# Patient Record
Sex: MALE | Race: WHITE | NOT HISPANIC OR LATINO | Employment: OTHER | URBAN - NONMETROPOLITAN AREA
[De-identification: names, ages, dates, MRNs, and addresses within clinical notes are randomized per-mention and may not be internally consistent; named-entity substitution may affect disease eponyms.]

---

## 2017-05-01 ENCOUNTER — OFFICE VISIT (OUTPATIENT)
Dept: FAMILY MEDICINE CLINIC | Facility: CLINIC | Age: 57
End: 2017-05-01

## 2017-05-01 VITALS
DIASTOLIC BLOOD PRESSURE: 90 MMHG | WEIGHT: 140 LBS | OXYGEN SATURATION: 97 % | SYSTOLIC BLOOD PRESSURE: 164 MMHG | BODY MASS INDEX: 22.5 KG/M2 | HEIGHT: 66 IN | HEART RATE: 102 BPM

## 2017-05-01 DIAGNOSIS — G90.50 RSD (REFLEX SYMPATHETIC DYSTROPHY): ICD-10-CM

## 2017-05-01 DIAGNOSIS — Z87.19 HISTORY OF DIVERTICULITIS: ICD-10-CM

## 2017-05-01 DIAGNOSIS — K21.9 GASTROESOPHAGEAL REFLUX DISEASE WITHOUT ESOPHAGITIS: ICD-10-CM

## 2017-05-01 DIAGNOSIS — G40.109 AUTONOMIC EPILEPTIC SEIZURES (HCC): ICD-10-CM

## 2017-05-01 DIAGNOSIS — G81.90 HEMIPARESIS (HCC): ICD-10-CM

## 2017-05-01 DIAGNOSIS — E78.2 MIXED HYPERLIPIDEMIA: ICD-10-CM

## 2017-05-01 DIAGNOSIS — I10 ESSENTIAL HYPERTENSION: ICD-10-CM

## 2017-05-01 PROBLEM — R55 VASOVAGAL SYNCOPE: Status: ACTIVE | Noted: 2017-05-01

## 2017-05-01 PROCEDURE — 99203 OFFICE O/P NEW LOW 30 MIN: CPT | Performed by: NURSE PRACTITIONER

## 2017-05-01 RX ORDER — DIPHENOXYLATE HYDROCHLORIDE AND ATROPINE SULFATE 2.5; .025 MG/1; MG/1
1 TABLET ORAL 4 TIMES DAILY PRN
COMMUNITY
End: 2019-09-12 | Stop reason: SDUPTHER

## 2017-05-01 RX ORDER — ACETAMINOPHEN AND CODEINE PHOSPHATE 300; 30 MG/1; MG/1
1 TABLET ORAL EVERY 6 HOURS PRN
COMMUNITY

## 2017-05-01 RX ORDER — LISINOPRIL AND HYDROCHLOROTHIAZIDE 20; 12.5 MG/1; MG/1
1 TABLET ORAL DAILY
COMMUNITY
End: 2017-08-01 | Stop reason: CLARIF

## 2017-05-01 RX ORDER — TRAMADOL HYDROCHLORIDE 50 MG/1
TABLET ORAL
Refills: 0 | COMMUNITY
Start: 2017-03-29

## 2017-05-01 RX ORDER — LORAZEPAM 2 MG/1
TABLET ORAL
Refills: 0 | COMMUNITY
Start: 2017-04-26 | End: 2017-05-25 | Stop reason: SDUPTHER

## 2017-05-25 RX ORDER — LORAZEPAM 2 MG/1
2 TABLET ORAL 2 TIMES DAILY PRN
Qty: 60 TABLET | Refills: 0 | OUTPATIENT
Start: 2017-05-25 | End: 2017-06-22 | Stop reason: SDUPTHER

## 2017-06-22 ENCOUNTER — TELEPHONE (OUTPATIENT)
Dept: FAMILY MEDICINE CLINIC | Facility: CLINIC | Age: 57
End: 2017-06-22

## 2017-06-22 RX ORDER — LORAZEPAM 2 MG/1
2 TABLET ORAL 2 TIMES DAILY PRN
Qty: 60 TABLET | Refills: 0 | OUTPATIENT
Start: 2017-06-22 | End: 2017-07-21 | Stop reason: SDUPTHER

## 2017-06-22 NOTE — TELEPHONE ENCOUNTER
Pt called and made his [facility] wellness exam and also questioned his rx request for ativan. Informed pt that Gita approved and we would be in touch with his pharmacy.

## 2017-06-23 ENCOUNTER — TELEPHONE (OUTPATIENT)
Dept: FAMILY MEDICINE CLINIC | Facility: CLINIC | Age: 57
End: 2017-06-23

## 2017-06-23 NOTE — TELEPHONE ENCOUNTER
----- Message from Jonas Terrazas sent at 6/22/2017 10:39 PM EDT -----  Regarding: RE: Prescription Question  Contact: [phone]  Thank you!  ----- Message -----  From: THEE CAMPOS  Sent: 6/22/2017  7:10 PM EDT  To: Jonas Terrazas  Subject: RE: Prescription Question    This has been called in to pharmacy.      ----- Message -----     From: Jonas Terrazas     Sent: 6/21/2017  1:22 PM EDT       To: DAVID Wilcox  Subject: Prescription Question    Good afternoon,    I am writing to have my prescription of Lorazepam refilled. It was last filled on May 25th at [facility] here in [place]. I am due to run out Saturday June 24th.    Thank you, in advance,    Jonas Terrazas  [address] [address]    Email: [email or web address]  Mobile phone: [phone]

## 2017-07-20 ENCOUNTER — TELEPHONE (OUTPATIENT)
Dept: FAMILY MEDICINE CLINIC | Facility: CLINIC | Age: 57
End: 2017-07-20

## 2017-07-20 NOTE — TELEPHONE ENCOUNTER
----- Message from Jonas Terrazas sent at 7/20/2017 10:19 AM EDT -----  Regarding: RE: Prescription Question  Contact: [phone]  Thank you very much!  ----- Message -----  From: THEE CAMPOS  Sent: 7/20/2017 10:09 AM EDT  To: Jonas Terrazas  Subject: RE: Prescription Question    I will get this request in to jordan. Just check with the pharmacy later this afternoon if you dont hear from me. I will call you if there is an issue with refilling it   thanks RAYMON Farnsworth      ----- Message -----     From: Jonas Terrazas     Sent: 7/20/2017  9:37 AM EDT       To: DAVID Wilcox  Subject: Prescription Question    Good morning,    I am writing to have my prescription of Lorazepam refilled. It was last filled on June 23rd at [facility] here in [place]. I am due to run out this Sunday July 23rd.    I am looking forward to my next follow up appointment on August 1st to discuss my blood pressure medication with the water pill mixed in with it.     Thank you, in advance,    Jonas Terrazas  [address] [id number]    Email: [email or web address]  Mobile phone: [phone]

## 2017-07-21 RX ORDER — LORAZEPAM 2 MG/1
2 TABLET ORAL 2 TIMES DAILY PRN
Qty: 60 TABLET | Refills: 0 | OUTPATIENT
Start: 2017-07-21 | End: 2017-08-21 | Stop reason: SDUPTHER

## 2017-07-21 NOTE — TELEPHONE ENCOUNTER
----- Message from Daja Carrillo MA sent at 7/21/2017 10:29 AM EDT -----  Called in refill to pharmacy    ----- Message -----     From: DAVID Wilcox     Sent: 7/20/2017   1:41 PM       To: Daja Carrillo MA    Ok to refill  ----- Message -----     From: Daja Carrillo MA     Sent: 7/20/2017  10:11 AM       To: DAVID Wilcox    Pt sent message requesting refill on Lorazepam. Updated Marco A in your tray for review.

## 2017-08-01 ENCOUNTER — OFFICE VISIT (OUTPATIENT)
Dept: FAMILY MEDICINE CLINIC | Facility: CLINIC | Age: 57
End: 2017-08-01

## 2017-08-01 VITALS
DIASTOLIC BLOOD PRESSURE: 84 MMHG | HEIGHT: 66 IN | WEIGHT: 142 LBS | OXYGEN SATURATION: 98 % | HEART RATE: 100 BPM | SYSTOLIC BLOOD PRESSURE: 164 MMHG | BODY MASS INDEX: 22.82 KG/M2

## 2017-08-01 DIAGNOSIS — K21.9 GASTROESOPHAGEAL REFLUX DISEASE WITHOUT ESOPHAGITIS: ICD-10-CM

## 2017-08-01 DIAGNOSIS — G81.90 HEMIPARESIS (HCC): ICD-10-CM

## 2017-08-01 DIAGNOSIS — E78.2 MIXED HYPERLIPIDEMIA: ICD-10-CM

## 2017-08-01 DIAGNOSIS — G89.21 CHRONIC PAIN DUE TO INJURY: ICD-10-CM

## 2017-08-01 DIAGNOSIS — G90.50 RSD (REFLEX SYMPATHETIC DYSTROPHY): ICD-10-CM

## 2017-08-01 DIAGNOSIS — M54.6 ACUTE MIDLINE THORACIC BACK PAIN: ICD-10-CM

## 2017-08-01 DIAGNOSIS — I10 ESSENTIAL HYPERTENSION: ICD-10-CM

## 2017-08-01 DIAGNOSIS — G40.109 AUTONOMIC EPILEPTIC SEIZURES (HCC): ICD-10-CM

## 2017-08-01 LAB
BILIRUB BLD-MCNC: NEGATIVE MG/DL
CLARITY, POC: ABNORMAL
COLOR UR: YELLOW
GLUCOSE UR STRIP-MCNC: NEGATIVE MG/DL
KETONES UR QL: NEGATIVE
LEUKOCYTE EST, POC: NEGATIVE
NITRITE UR-MCNC: NEGATIVE MG/ML
PH UR: 6 [PH] (ref 5–8)
PROT UR STRIP-MCNC: NEGATIVE MG/DL
RBC # UR STRIP: ABNORMAL /UL
SP GR UR: 1.01 (ref 1–1.03)
UROBILINOGEN UR QL: NORMAL

## 2017-08-01 PROCEDURE — 81003 URINALYSIS AUTO W/O SCOPE: CPT | Performed by: NURSE PRACTITIONER

## 2017-08-01 PROCEDURE — 36415 COLL VENOUS BLD VENIPUNCTURE: CPT | Performed by: NURSE PRACTITIONER

## 2017-08-01 PROCEDURE — 99214 OFFICE O/P EST MOD 30 MIN: CPT | Performed by: NURSE PRACTITIONER

## 2017-08-01 RX ORDER — OMEPRAZOLE 20 MG/1
20 CAPSULE, DELAYED RELEASE ORAL DAILY
COMMUNITY
End: 2022-08-18

## 2017-08-01 RX ORDER — LISINOPRIL 20 MG/1
20 TABLET ORAL DAILY
Qty: 30 TABLET | Refills: 1 | Status: SHIPPED | OUTPATIENT
Start: 2017-08-01 | End: 2017-09-26 | Stop reason: SDUPTHER

## 2017-08-01 RX ORDER — FAMOTIDINE 10 MG
10 TABLET ORAL DAILY PRN
COMMUNITY
End: 2022-08-18
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
## 2017-08-02 ENCOUNTER — HOSPITAL ENCOUNTER (OUTPATIENT)
Dept: GENERAL RADIOLOGY | Facility: HOSPITAL | Age: 57
Discharge: HOME | End: 2017-08-02
Admitting: NURSE PRACTITIONER

## 2017-08-02 ENCOUNTER — TELEPHONE (OUTPATIENT)
Dept: FAMILY MEDICINE CLINIC | Facility: CLINIC | Age: 57
End: 2017-08-02

## 2017-08-02 DIAGNOSIS — M54.6 ACUTE MIDLINE THORACIC BACK PAIN: ICD-10-CM

## 2017-08-02 LAB
ALBUMIN SERPL-MCNC: 5 G/DL (ref 3.5–5.5)
ALBUMIN/GLOB SERPL: 1.7 {RATIO} (ref 1.2–2.2)
ALP SERPL-CCNC: 87 IU/L (ref 39–117)
ALT SERPL-CCNC: 14 IU/L (ref 0–44)
AST SERPL-CCNC: 18 IU/L (ref 0–40)
BILIRUB SERPL-MCNC: 0.5 MG/DL (ref 0–1.2)
BUN SERPL-MCNC: 10 MG/DL (ref 6–24)
BUN/CREAT SERPL: 11 (ref 9–20)
CALCIUM SERPL-MCNC: 9.6 MG/DL (ref 8.7–10.2)
CHLORIDE SERPL-SCNC: 99 MMOL/L (ref 96–106)
CO2 SERPL-SCNC: 24 MMOL/L (ref 18–29)
CREAT SERPL-MCNC: 0.93 MG/DL (ref 0.76–1.27)
GLOBULIN SER CALC-MCNC: 3 G/DL (ref 1.5–4.5)
GLUCOSE SERPL-MCNC: 90 MG/DL (ref 65–99)
POTASSIUM SERPL-SCNC: 4.2 MMOL/L (ref 3.5–5.2)
PROT SERPL-MCNC: 8 G/DL (ref 6–8.5)
SODIUM SERPL-SCNC: 141 MMOL/L (ref 134–144)

## 2017-08-02 PROCEDURE — 72072 X-RAY EXAM THORAC SPINE 3VWS: CPT

## 2017-08-03 ENCOUNTER — TELEPHONE (OUTPATIENT)
Dept: FAMILY MEDICINE CLINIC | Facility: CLINIC | Age: 57
End: 2017-08-03

## 2017-08-03 DIAGNOSIS — R93.89 ABNORMAL X-RAY: ICD-10-CM

## 2017-08-03 DIAGNOSIS — M54.6 ACUTE MIDLINE THORACIC BACK PAIN: Primary | ICD-10-CM

## 2017-08-03 DIAGNOSIS — R31.9 HEMATURIA: Primary | ICD-10-CM
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
## 2017-08-03 NOTE — TELEPHONE ENCOUNTER
I spoke with pt and notified and he said that he wants to hold off on urology referral for right now. I notified yuan. He said that he would like to have another urine check in about a week and if it is still bad then go to urology. I told him that I would let you know but that should be fine.

## 2017-08-03 NOTE — TELEPHONE ENCOUNTER
----- Message from Daja Carrillo MA sent at 8/3/2017  2:14 PM EDT -----  Pt notified of results     ----- Message -----     From: DAVID Wilcox     Sent: 8/3/2017   1:09 PM       To: Daja Carrillo MA    His labs are  normal.

## 2017-08-10 ENCOUNTER — HOSPITAL ENCOUNTER (OUTPATIENT)
Dept: MRI IMAGING | Facility: HOSPITAL | Age: 57
Discharge: HOME | End: 2017-08-10
Admitting: NURSE PRACTITIONER

## 2017-08-10 DIAGNOSIS — R93.89 ABNORMAL X-RAY: ICD-10-CM

## 2017-08-10 DIAGNOSIS — M54.6 ACUTE MIDLINE THORACIC BACK PAIN: ICD-10-CM

## 2017-08-10 PROCEDURE — 72146 MRI CHEST SPINE W/O DYE: CPT

## 2017-08-13 DIAGNOSIS — M43.02 SPONDYLOLYSIS OF CERVICAL REGION: ICD-10-CM

## 2017-08-13 DIAGNOSIS — M50.90 CERVICAL NECK PAIN WITH EVIDENCE OF DISC DISEASE: Primary | ICD-10-CM

## 2017-08-14 ENCOUNTER — TELEPHONE (OUTPATIENT)
Dept: FAMILY MEDICINE CLINIC | Facility: CLINIC | Age: 57
End: 2017-08-14

## 2017-08-14 DIAGNOSIS — M54.6 THORACIC SPINE PAIN: Primary | ICD-10-CM

## 2017-08-14 DIAGNOSIS — M43.04 SPONDYLOLYSIS OF THORACIC REGION: ICD-10-CM

## 2017-08-14 NOTE — TELEPHONE ENCOUNTER
----- Message from Daja Carrillo MA sent at 8/14/2017  7:25 PM EDT -----  Pt notified of results and notified that we would contact him with apt of scan when scheduled    ----- Message -----     From: DAVID Wilcox     Sent: 8/13/2017   9:15 PM       To: Daja Carrillo MA    His MRI shows some spondylosis and spurring in his neck, so I am going to refer him to Neurosurgery and see if they want to do anything, since he is having pain.

## 2017-08-17 ENCOUNTER — TELEPHONE (OUTPATIENT)
Dept: FAMILY MEDICINE CLINIC | Facility: CLINIC | Age: 57
End: 2017-08-17

## 2017-08-17 NOTE — TELEPHONE ENCOUNTER
----- Message from Jonas Terrazas sent at 8/17/2017 12:59 PM EDT -----  Regarding: RE: Prescription Question  Contact: [phone]  Thank you Daja!  ----- Message -----  From: THEE CAMPOS  Sent: 8/17/2017 12:53 PM EDT  To: Jonas Terrazas  Subject: RE: Prescription Question    I have sent in the request to jordan. Should not be a problem. You can call the pharmacy later this after noon and check if refill had been sent in.  Thank You  RAYMON Farnsworth          ----- Message -----     From: Jonas Terrazas     Sent: 8/17/2017 12:39 PM EDT       To: DAVID Wilcox  Subject: Prescription Question    Good afternoon,    I am writing to have my prescription of Lorazepam refilled. It was last filled on July 21st at [facility] here in [place]. I am due to run out this Sunday, August 20th.    I will see you at my next follow up appointment on September 1st.    Thank you, in advance,    Jonas Terrazas  [address] [address]    Email: [email or web address]  Mobile phone: [phone]

## 2017-08-21 ENCOUNTER — TELEPHONE (OUTPATIENT)
Dept: FAMILY MEDICINE CLINIC | Facility: CLINIC | Age: 57
End: 2017-08-21

## 2017-08-21 RX ORDER — LORAZEPAM 2 MG/1
2 TABLET ORAL 2 TIMES DAILY PRN
Qty: 4 TABLET | Refills: 0 | OUTPATIENT
Start: 2017-08-21 | End: 2017-08-23 | Stop reason: SDUPTHER

## 2017-08-21 NOTE — TELEPHONE ENCOUNTER
Patient sent a message through my chart and he said jhon told him his refill for his ativan would be called in and ready to  on the 21st, but [facility] didn't have any record of it being called in.  Marco A in your basket.

## 2017-08-21 NOTE — TELEPHONE ENCOUNTER
Please call in refilled in med list. (#4 no refills); also Crystal will need a reminder for full refill tomorrow.

## 2017-08-21 NOTE — TELEPHONE ENCOUNTER
----- Message from Krissy Tellez MA sent at 8/21/2017 12:36 PM EDT -----  Regarding: FW: Prescription Question  Contact: [phone]      ----- Message -----     From: Jonas Terrazas     Sent: 8/21/2017  12:17 PM       To: [facility]  Subject: RE: Prescription Question                        Judi Rey,    Yes, to be on the safe side please call it in. Then I will be all set until tomorrow :-).     Thank you, very much,    Jonas  ----- Message -----  From: THEE CAMPOS  Sent: 8/21/2017 11:53 AM EDT  To: Jonas Terrazas  Subject: RE: Prescription Question    Moonshital, I'm Krissy the [facility] for Dr. Gaines, Gita and Daja are out of the office today. Gita will be back tomorrow.  I did look in you chart and seen the message to have your lorazepam refilled but I didn't see a response from Gita for a approval to call it in.  I checked with Dr. Gaines to see if she could refill it for you.  Dr Gaines said she could give script for one pill for today if you out, or you can wait until tomorrow for Gita to OK the whole months supply. Let me know what you would like us to do.    Thank you   Krissy Tellez [facility]    ----- Message -----     From: Jonas Terrazas     Sent: 8/19/2017 12:03 PM EDT       To: DAVID Wilcox  Subject: RE: Prescription Question    Judi Farnsworth,    I called [facility] pharmacy today (Saturday) to check if the refill for Lorazepam was called in for pickup tomorrow. They couldn't find anything yet. Could you check on the status, thank you :-)    Jonas  ----- Message -----  From: THEE CAMPOS  Sent: 8/17/2017 12:53 PM EDT  To: Jonas Terrazas  Subject: RE: Prescription Question    I have sent in the request to gita. Should not be a problem. You can call the pharmacy later this after noon and check if refill had been sent in.  Thank You  RAYMON Farnsworth          ----- Message -----     From: Jonas Terrazas     Sent: 8/17/2017 12:39 PM EDT       To: Gita Quesada,  DAVID  Subject: Prescription Question    Good afternoon,    I am writing to have my prescription of Lorazepam refilled. It was last filled on July 21st at [facility] here in [place]. I am due to run out this Sunday, August 20th.    I will see you at my next follow up appointment on September 1st.    Thank you, in advance,    Jonas Terrazas  [address] [place], [place] [id number]    Email: [email or web address]  Mobile phone: [phone]

## 2017-08-23 RX ORDER — LORAZEPAM 2 MG/1
2 TABLET ORAL 2 TIMES DAILY PRN
Qty: 60 TABLET | Refills: 0 | OUTPATIENT
Start: 2017-08-23 | End: 2017-09-22 | Stop reason: SDUPTHER

## 2017-08-23 NOTE — TELEPHONE ENCOUNTER
Patient was requesting a refill on his ativan. Dr. Gaines gave him in 4 pills until you returned to work, he called today to check the status on it.

## 2017-09-22 RX ORDER — LORAZEPAM 2 MG/1
2 TABLET ORAL 2 TIMES DAILY PRN
Qty: 60 TABLET | Refills: 0 | OUTPATIENT
Start: 2017-09-22 | End: 2017-10-20 | Stop reason: SDUPTHER

## 2017-09-26 ENCOUNTER — OFFICE VISIT (OUTPATIENT)
Dept: FAMILY MEDICINE CLINIC | Facility: CLINIC | Age: 57
End: 2017-09-26

## 2017-09-26 VITALS
HEART RATE: 100 BPM | DIASTOLIC BLOOD PRESSURE: 78 MMHG | HEIGHT: 66 IN | SYSTOLIC BLOOD PRESSURE: 120 MMHG | OXYGEN SATURATION: 99 % | BODY MASS INDEX: 23.3 KG/M2 | WEIGHT: 145 LBS

## 2017-09-26 DIAGNOSIS — I10 ESSENTIAL HYPERTENSION: ICD-10-CM

## 2017-09-26 PROCEDURE — 99214 OFFICE O/P EST MOD 30 MIN: CPT | Performed by: NURSE PRACTITIONER

## 2017-09-26 RX ORDER — LISINOPRIL 20 MG/1
20 TABLET ORAL DAILY
Qty: 30 TABLET | Refills: 2 | Status: SHIPPED | OUTPATIENT
Start: 2017-09-26 | End: 2017-12-28 | Stop reason: SDUPTHER

## 2017-09-26 NOTE — PROGRESS NOTES
Subjective   Jonas Terrazas is a 56 y.o. male.     Hypertension   This is a recurrent problem. The current episode started more than 1 year ago. The problem has been rapidly improving since onset. The problem is controlled. Pertinent negatives include no anxiety, blurred vision, chest pain, headaches, malaise/fatigue, neck pain, orthopnea, palpitations, peripheral edema, PND, shortness of breath or sweats. Agents associated with hypertension include NSAIDs. There are no known risk factors for coronary artery disease. There are no compliance problems.         The following portions of the patient's history were reviewed and updated as appropriate: allergies, current medications, past family history, past medical history, past social history, past surgical history and problem list.    Review of Systems   Constitutional: Negative.  Negative for malaise/fatigue.   HENT: Negative.    Eyes: Negative.  Negative for blurred vision.   Respiratory: Negative.  Negative for shortness of breath.    Cardiovascular: Negative.  Negative for chest pain, palpitations, orthopnea and PND.   Gastrointestinal: Negative.    Genitourinary: Negative.    Musculoskeletal: Negative.  Negative for neck pain.   Skin: Negative.    Neurological: Negative for dizziness, syncope, weakness, numbness and headaches.   Hematological: Negative for adenopathy.   Psychiatric/Behavioral: Negative for confusion and suicidal ideas. The patient is not nervous/anxious.        Objective   Physical Exam   Constitutional: He is oriented to person, place, and time. He appears well-developed and well-nourished. No distress.   HENT:   Head: Normocephalic.   Right Ear: External ear normal.   Left Ear: External ear normal.   Nose: Nose normal.   Mouth/Throat: Oropharynx is clear and moist. No oropharyngeal exudate.   Eyes: Conjunctivae are normal.   Neck: Normal range of motion. Neck supple.   Cardiovascular: Normal rate, regular rhythm, normal heart sounds and intact  distal pulses.    No murmur heard.  Pulmonary/Chest: Effort normal and breath sounds normal. No respiratory distress. He has no wheezes. He has no rales. He exhibits no tenderness.   Abdominal: Soft. Bowel sounds are normal. He exhibits no distension and no mass. There is no hepatosplenomegaly or splenomegaly. There is no tenderness. There is no rebound and no guarding. No hernia.   Musculoskeletal: Normal range of motion. He exhibits no edema or tenderness.   NROM in all major joints   Neurological: He is alert and oriented to person, place, and time. Coordination and gait normal.   Skin: Skin is warm and dry. No rash noted.   Psychiatric: He has a normal mood and affect. His behavior is normal. Judgment and thought content normal.       Assessment/Plan   Jonas was seen today for follow-up.    Diagnoses and all orders for this visit:    Essential hypertension  -     lisinopril (PRINIVIL,ZESTRIL) 20 MG tablet; Take 1 tablet by mouth Daily.    Lisinopril continued today, since it is working well for him.    Patient has no concerns at this time.    Patient to RTC in 3 months for follow up.

## 2017-10-20 RX ORDER — LORAZEPAM 2 MG/1
2 TABLET ORAL 2 TIMES DAILY PRN
Qty: 60 TABLET | Refills: 0 | OUTPATIENT
Start: 2017-10-20 | End: 2017-11-17 | Stop reason: SDUPTHER

## 2017-10-20 NOTE — TELEPHONE ENCOUNTER
Called in to pharmacy. Pt notified      --- Message from DAVID Wilcox sent at 10/17/2017 12:22 PM EDT -----  Regarding: FW: Prescription Question  Contact: [phone]   Ok to fill Friday  ----- Message -----     From: Krissy Tellez MA     Sent: 10/17/2017  10:34 AM       To: DAVID Wilcox  Subject: FW: Prescription Question                            ----- Message -----     From: Jonas Terrazas     Sent: 10/17/2017  10:21 AM       To: [facility]  Subject: Prescription Question                            Greetings, on the this chilly fall morning.  My wife and I are having to adjust to the fall weather.  In [place] it was regularly in the mid to higher 80’s at this time of year.  We like this weather better :-).    I think Gita is off on Wednesdays. I am writing to have my prescription of Lorazepam scheduled for refill. It was last filled on September 22nd at [facility] here in [place].  I am due to run out this Sunday.     Thank you, in advance,    Jonas Terrazas  [address] [place] [address]    Email: [email or web address]  Mobile phone: [phone]

## 2017-11-16 ENCOUNTER — TELEPHONE (OUTPATIENT)
Dept: FAMILY MEDICINE CLINIC | Facility: CLINIC | Age: 57
End: 2017-11-16

## 2017-11-16 NOTE — TELEPHONE ENCOUNTER
----- Message from Jonas Terrazas sent at 11/15/2017 10:41 AM EST -----  Regarding: Prescription Question  Contact: [phone]  Hello,&#8232;&#8232;    I am writing to have my prescription of Lorazepam scheduled for refill. It was last filled on October 20th at [facility] here in [place]. I am due to run out this Sunday. &#8232;&#8232;    PS - Getting ready for Thanksgiving, and I can’t believe it is 39 days until Idania!  &#8232;  Thank you, in advance,&#8232;&#8232;    Jonas Terrazas&#8232;  [address]  &#8232;[place] [address]&#82[address];&#8232;    Email: [email or web address]&#[email or web address];  Mobile phone: [phone]

## 2017-11-17 RX ORDER — LORAZEPAM 2 MG/1
2 TABLET ORAL 2 TIMES DAILY PRN
Qty: 60 TABLET | Refills: 0 | OUTPATIENT
Start: 2017-11-17 | End: 2017-12-15 | Stop reason: SDUPTHER

## 2017-12-13 ENCOUNTER — TELEPHONE (OUTPATIENT)
Dept: FAMILY MEDICINE CLINIC | Facility: CLINIC | Age: 57
End: 2017-12-13

## 2017-12-13 NOTE — TELEPHONE ENCOUNTER
----- Message from Jonas Terrazas sent at 12/13/2017  5:15 PM EST -----  Regarding: Prescription Question  Contact: [phone]  Judi Pelayo and Daja,     Well, my wife and I have experienced our first [place] snow fall... It was nice to see and I hope we get some snow for Idania.    I am writing to have my prescription of Lorazepam scheduled for refill. It was last filled on November 17th at [facility] here in [place]. I am due to run out this Sunday.     I will see you at my follow up appointment on December 28th. I hope you and your family have a Merry Idania!     Thank you, in advance,    Jonas Terrazas  [facility] [place] [address]    Email: [email or web address]  Mobile phone: [phone]

## 2017-12-15 RX ORDER — LORAZEPAM 2 MG/1
2 TABLET ORAL 2 TIMES DAILY PRN
Qty: 60 TABLET | Refills: 0 | OUTPATIENT
Start: 2017-12-15 | End: 2018-01-10 | Stop reason: SDUPTHER

## 2017-12-28 ENCOUNTER — OFFICE VISIT (OUTPATIENT)
Dept: FAMILY MEDICINE CLINIC | Facility: CLINIC | Age: 57
End: 2017-12-28

## 2017-12-28 VITALS
DIASTOLIC BLOOD PRESSURE: 90 MMHG | BODY MASS INDEX: 23.24 KG/M2 | OXYGEN SATURATION: 96 % | HEART RATE: 94 BPM | SYSTOLIC BLOOD PRESSURE: 128 MMHG | WEIGHT: 144 LBS

## 2017-12-28 DIAGNOSIS — E78.2 MIXED HYPERLIPIDEMIA: ICD-10-CM

## 2017-12-28 DIAGNOSIS — G40.109 AUTONOMIC EPILEPTIC SEIZURES (HCC): ICD-10-CM

## 2017-12-28 DIAGNOSIS — I10 ESSENTIAL HYPERTENSION: ICD-10-CM

## 2017-12-28 DIAGNOSIS — K21.9 GASTROESOPHAGEAL REFLUX DISEASE WITHOUT ESOPHAGITIS: ICD-10-CM

## 2017-12-28 PROCEDURE — 99214 OFFICE O/P EST MOD 30 MIN: CPT | Performed by: NURSE PRACTITIONER

## 2017-12-28 RX ORDER — LISINOPRIL 20 MG/1
20 TABLET ORAL DAILY
Qty: 30 TABLET | Refills: 2 | Status: SHIPPED | OUTPATIENT
Start: 2017-12-28 | End: 2018-02-21 | Stop reason: SDUPTHER

## 2017-12-28 RX ORDER — ASPIRIN 81 MG/1
81 TABLET ORAL DAILY
COMMUNITY
End: 2022-03-31

## 2017-12-28 RX ORDER — LISINOPRIL 20 MG/1
20 TABLET ORAL DAILY
Qty: 30 TABLET | Refills: 2 | Status: SHIPPED | OUTPATIENT
Start: 2017-12-28 | End: 2017-12-28 | Stop reason: SDUPTHER

## 2017-12-28 RX ORDER — MORPHINE SULFATE 1 MG/ML
INJECTION, SOLUTION INTRAVENOUS CONTINUOUS
COMMUNITY

## 2017-12-28 NOTE — PROGRESS NOTES
Subjective   Jonas Terrazas is a 57 y.o. male.     Hypertension   This is a recurrent problem. The current episode started more than 1 year ago. The problem has been rapidly improving since onset. The problem is controlled. Pertinent negatives include no anxiety, blurred vision, chest pain, headaches, malaise/fatigue, neck pain, orthopnea, palpitations, peripheral edema, PND, shortness of breath or sweats. Agents associated with hypertension include decongestants and NSAIDs. Risk factors for coronary artery disease include family history. There are no compliance problems.    Hyperlipidemia   Pertinent negatives include no chest pain or shortness of breath.      His GERD is well controlled with the Pepcid and Prilosec.    His seizure disorder is well controlled, with no recent activity, with Ativan.    He is tolerating all of his medications well and taking them all as directed.     He is still seeing Pain Management for management of his pain and pain pump.    The following portions of the patient's history were reviewed and updated as appropriate: allergies, current medications, past family history, past medical history, past social history, past surgical history and problem list.    Review of Systems   Constitutional: Negative.  Negative for malaise/fatigue.   HENT: Positive for hearing loss (Having hearing tested next week in [place]). Negative for congestion, dental problem, ear discharge, ear pain, nosebleeds, postnasal drip, rhinorrhea, sinus pain, sinus pressure, sore throat, trouble swallowing and voice change.    Eyes: Negative.  Negative for blurred vision.   Respiratory: Negative for apnea, cough, chest tightness, shortness of breath and wheezing.    Cardiovascular: Negative for chest pain, palpitations, orthopnea, leg swelling and PND.   Gastrointestinal: Negative.    Endocrine: Negative.    Genitourinary: Negative.    Musculoskeletal: Negative for neck pain.   Allergic/Immunologic: Negative.     Neurological: Negative for dizziness, weakness, numbness and headaches.   Hematological: Negative.    Psychiatric/Behavioral: Negative.        Objective   Physical Exam   Constitutional: He is oriented to person, place, and time. He appears well-developed and well-nourished. No distress.   HENT:   Head: Normocephalic.   Right Ear: External ear normal.   Left Ear: External ear normal.   Nose: Nose normal.   Mouth/Throat: Oropharynx is clear and moist. No oropharyngeal exudate.   Eyes: Conjunctivae are normal.   Neck: Normal range of motion. Neck supple. No thyromegaly present.   Cardiovascular: Normal rate, regular rhythm, normal heart sounds and intact distal pulses.    No murmur heard.  Pulmonary/Chest: Effort normal and breath sounds normal. No respiratory distress. He has no wheezes. He has no rales. He exhibits no tenderness.   Abdominal: Soft. Bowel sounds are normal. He exhibits no distension and no mass. There is no hepatosplenomegaly or splenomegaly. There is no tenderness. No hernia.   Musculoskeletal: Normal range of motion. He exhibits no edema or tenderness.   Decreased ROM r/t hemiparesis and chronic pelvic and back pain   Neurological: He is alert and oriented to person, place, and time. Coordination and gait normal.   Skin: Skin is warm and dry. No rash noted.   Psychiatric: He has a normal mood and affect. His behavior is normal. Judgment and thought content normal.   Nursing note and vitals reviewed.      Assessment/Plan   Jonas was seen today for hypertension and hyperlipidemia.    Diagnoses and all orders for this visit:    Essential hypertension  -     Discontinue: lisinopril (PRINIVIL,ZESTRIL) 20 MG tablet; Take 1 tablet by mouth Daily.  -     lisinopril (PRINIVIL,ZESTRIL) 20 MG tablet; Take 1 tablet by mouth Daily.  -     Comprehensive Metabolic Panel; Future    Mixed hyperlipidemia  -     Comprehensive Metabolic Panel; Future  -     Lipid Panel; Future    Gastroesophageal reflux disease  without esophagitis  -     Comprehensive Metabolic Panel; Future    Autonomic epileptic seizures  -     Comprehensive Metabolic Panel; Future      Patient to continue all medications as directed.    Screening labs ordered and patient to RTC fasting. I will contact patient regarding test results and provide instructions regarding any necessary changes in plan of care.    Patient was encouraged to keep me informed of any acute changes, or any new concerning symptoms. Patient voiced understanding of all instructions and denied further questions.    Patient to RTC for his 3 month follow up and prn.

## 2017-12-28 NOTE — PROGRESS NOTES
Answers for HPI/ROS submitted by the patient on 12/26/2017   Hypertension  Chronicity: recurrent  Onset: more than 1 year ago  Progression since onset: rapidly improving  Condition status: controlled  anxiety: No  blurred vision: No  chest pain: No  headaches: No  malaise/fatigue: No  neck pain: No  orthopnea: No  palpitations: No  peripheral edema: No  PND: No  shortness of breath: No  sweats: No  Agents associated with hypertension: decongestants, NSAIDs  CAD risks: family history  Compliance problems: no compliance problems

## 2018-01-02 ENCOUNTER — RESULTS ENCOUNTER (OUTPATIENT)
Dept: FAMILY MEDICINE CLINIC | Facility: CLINIC | Age: 58
End: 2018-01-02

## 2018-01-02 DIAGNOSIS — G40.109 AUTONOMIC EPILEPTIC SEIZURES (HCC): ICD-10-CM

## 2018-01-02 DIAGNOSIS — K21.9 GASTROESOPHAGEAL REFLUX DISEASE WITHOUT ESOPHAGITIS: ICD-10-CM

## 2018-01-02 DIAGNOSIS — I10 ESSENTIAL HYPERTENSION: ICD-10-CM

## 2018-01-02 DIAGNOSIS — E78.2 MIXED HYPERLIPIDEMIA: ICD-10-CM

## 2018-01-08 ENCOUNTER — LAB (OUTPATIENT)
Dept: FAMILY MEDICINE CLINIC | Facility: CLINIC | Age: 58
End: 2018-01-08

## 2018-01-08 DIAGNOSIS — Z23 NEED FOR INFLUENZA VACCINATION: ICD-10-CM

## 2018-01-08 LAB
ALBUMIN SERPL-MCNC: 4.5 G/DL (ref 3.5–5)
ALBUMIN/GLOB SERPL: 1.5 G/DL (ref 1–2)
ALP SERPL-CCNC: 86 U/L (ref 38–126)
ALT SERPL-CCNC: 40 U/L (ref 13–69)
AST SERPL-CCNC: 36 U/L (ref 15–46)
BILIRUB SERPL-MCNC: 0.6 MG/DL (ref 0.2–1.3)
BUN SERPL-MCNC: 16 MG/DL (ref 7–20)
BUN/CREAT SERPL: 14.5 (ref 6.3–21.9)
CALCIUM SERPL-MCNC: 9.3 MG/DL (ref 8.4–10.2)
CHLORIDE SERPL-SCNC: 100 MMOL/L (ref 98–107)
CHOLEST SERPL-MCNC: 201 MG/DL (ref 0–199)
CO2 SERPL-SCNC: 28 MMOL/L (ref 26–30)
CREAT SERPL-MCNC: 1.1 MG/DL (ref 0.6–1.3)
GLOBULIN SER CALC-MCNC: 3 GM/DL
GLUCOSE SERPL-MCNC: 84 MG/DL (ref 74–98)
HDLC SERPL-MCNC: 38 MG/DL (ref 40–60)
LDLC SERPL CALC-MCNC: 142 MG/DL (ref 0–99)
POTASSIUM SERPL-SCNC: 3.6 MMOL/L (ref 3.5–5.1)
PROT SERPL-MCNC: 7.5 G/DL (ref 6.3–8.2)
SODIUM SERPL-SCNC: 139 MMOL/L (ref 137–145)
TRIGL SERPL-MCNC: 107 MG/DL
VLDLC SERPL CALC-MCNC: 21.4 MG/DL

## 2018-01-08 PROCEDURE — 90686 IIV4 VACC NO PRSV 0.5 ML IM: CPT | Performed by: NURSE PRACTITIONER

## 2018-01-08 PROCEDURE — G0008 ADMIN INFLUENZA VIRUS VAC: HCPCS | Performed by: NURSE PRACTITIONER

## 2018-01-09 ENCOUNTER — TELEPHONE (OUTPATIENT)
Dept: FAMILY MEDICINE CLINIC | Facility: CLINIC | Age: 58
End: 2018-01-09

## 2018-01-09 NOTE — TELEPHONE ENCOUNTER
----- Message from DAVID Wilcox sent at 1/9/2018  1:11 PM EST -----  His good cholesterol is a little low and his bad cholesterol a little high. We would like his bad cholesterol to be less than 130, his is 142, and his good cholest to be greater than 40 and his is 38. Does he take fish oil?

## 2018-01-09 NOTE — TELEPHONE ENCOUNTER
Pt notified of results and does not take fish oil. Per jordan needs to start on OTC fish oil. Pt notified

## 2018-01-10 ENCOUNTER — TELEPHONE (OUTPATIENT)
Dept: FAMILY MEDICINE CLINIC | Facility: CLINIC | Age: 58
End: 2018-01-10

## 2018-01-10 RX ORDER — LORAZEPAM 2 MG/1
2 TABLET ORAL 2 TIMES DAILY PRN
Qty: 60 TABLET | Refills: 0 | OUTPATIENT
Start: 2018-01-10 | End: 2018-02-09 | Stop reason: SDUPTHER

## 2018-01-10 NOTE — TELEPHONE ENCOUNTER
----- Message from Jonas Terrazas sent at 1/10/2018  1:07 PM EST -----  Regarding: Prescription Question  Contact: [phone]  Judi Pelayo and Daja,     I am writing to have my prescription of Lorazepam scheduled for refill.  It was last filled on December 15th at [facility] here in [place].  I am due to run out this Saturday.     I will see you at my next follow up appointment on March 29th.    Thank you, in advance,    Jonas Terrazas  [address] [place] [address]    Email: [email or web address]  Mobile phone: [phone]

## 2018-02-08 ENCOUNTER — TELEPHONE (OUTPATIENT)
Dept: FAMILY MEDICINE CLINIC | Facility: CLINIC | Age: 58
End: 2018-02-08

## 2018-02-09 RX ORDER — LORAZEPAM 2 MG/1
2 TABLET ORAL 2 TIMES DAILY PRN
Qty: 60 TABLET | Refills: 0 | OUTPATIENT
Start: 2018-02-09 | End: 2018-03-13 | Stop reason: SDUPTHER

## 2018-02-09 NOTE — TELEPHONE ENCOUNTER
----- Message from Jonas Terrazas sent at 2/8/2018 12:35 PM EST -----  Regarding: Prescription Question  Contact: [phone]  Judi Pelayo and Daja,     I am writing to have my prescription of Lorazepam scheduled for refill.  It was last filled on January 13th at [facility] here in [place].  I am due to run out Monday.     Thank you, in advance,    Jonas Terrazas  [address] [place] [address]    Email: [email or web address]  Mobile phone: [phone]

## 2018-02-21 DIAGNOSIS — I10 ESSENTIAL HYPERTENSION: ICD-10-CM

## 2018-02-21 RX ORDER — LISINOPRIL 20 MG/1
20 TABLET ORAL DAILY
Qty: 90 TABLET | Refills: 0 | Status: SHIPPED | OUTPATIENT
Start: 2018-02-21 | End: 2018-07-05 | Stop reason: SDUPTHER

## 2018-03-12 ENCOUNTER — TELEPHONE (OUTPATIENT)
Dept: FAMILY MEDICINE CLINIC | Facility: CLINIC | Age: 58
End: 2018-03-12

## 2018-03-12 NOTE — TELEPHONE ENCOUNTER
----- Message from Jonas Terrazas sent at 3/10/2018 12:49 AM EST -----  Regarding: Prescription Question  Contact: [phone]  Judi Pelayo and Daja,     I am writing to have my prescription of Lorazepam scheduled for refill. It was last filled on February 10th at [facility] here in [place]. I am due to run out Monday, March 12th.    Thank you, in advance,    Jonas Terrazas  [facility] [place] [address]    Email: [email or web address]  Mobile phone: [phone]

## 2018-03-12 NOTE — TELEPHONE ENCOUNTER
I called this in to pharmacy but it will not let me sign off on it so I need you to sign that it was phoned in

## 2018-03-13 RX ORDER — LORAZEPAM 2 MG/1
2 TABLET ORAL 2 TIMES DAILY PRN
Qty: 60 TABLET | Refills: 0 | OUTPATIENT
Start: 2018-03-13 | End: 2018-04-12 | Stop reason: SDUPTHER

## 2018-04-05 ENCOUNTER — OFFICE VISIT (OUTPATIENT)
Dept: FAMILY MEDICINE CLINIC | Facility: CLINIC | Age: 58
End: 2018-04-05

## 2018-04-05 VITALS
OXYGEN SATURATION: 96 % | HEART RATE: 95 BPM | SYSTOLIC BLOOD PRESSURE: 138 MMHG | HEIGHT: 66 IN | BODY MASS INDEX: 24.11 KG/M2 | DIASTOLIC BLOOD PRESSURE: 80 MMHG | WEIGHT: 150 LBS

## 2018-04-05 DIAGNOSIS — E78.2 MIXED HYPERLIPIDEMIA: ICD-10-CM

## 2018-04-05 DIAGNOSIS — K21.9 GASTROESOPHAGEAL REFLUX DISEASE WITHOUT ESOPHAGITIS: ICD-10-CM

## 2018-04-05 DIAGNOSIS — G40.109 AUTONOMIC EPILEPTIC SEIZURES (HCC): ICD-10-CM

## 2018-04-05 DIAGNOSIS — I10 ESSENTIAL HYPERTENSION: ICD-10-CM

## 2018-04-05 PROCEDURE — 99214 OFFICE O/P EST MOD 30 MIN: CPT | Performed by: NURSE PRACTITIONER

## 2018-04-05 NOTE — PROGRESS NOTES
Subjective   Jonas Terrazas is a 57 y.o. male.     Patient comes to clinic today for 3 month follow up regarding HTN, Seizures, HLD and GERD    HLD- Currently controlled with diet, patient states he is making better food choices and reducing fat in his diet.    HTN- Currently controlled with Lisinopril, not having any adverse effects from the medication.     Seizures- Currently takes Ativan to control seizures and has not had once in over 2 years, denies any adverse effects from medication.     GERD- Currently controlled with Prilosec and Pepcid, denies any adverse effects from medications.    Goes to a pain clinic for chronic pain/pain pump from injury sustained several years ago on the job as a .          The following portions of the patient's history were reviewed and updated as appropriate: allergies, current medications, past family history, past medical history, past social history, past surgical history and problem list.    Review of Systems   Constitutional: Negative.  Negative for fatigue.   HENT: Negative.    Eyes: Negative.    Respiratory: Negative.    Cardiovascular: Negative.    Gastrointestinal: Negative.    Genitourinary: Negative.    Musculoskeletal: Negative.    Skin: Negative.    Neurological: Negative for dizziness, syncope, weakness and numbness.   Hematological: Negative for adenopathy.   Psychiatric/Behavioral: Negative for confusion and suicidal ideas. The patient is not nervous/anxious.        Objective   Physical Exam   Constitutional: He is oriented to person, place, and time. He appears well-developed and well-nourished. No distress.   HENT:   Head: Normocephalic.   Right Ear: External ear normal.   Left Ear: External ear normal.   Nose: Nose normal.   Mouth/Throat: Oropharynx is clear and moist. No oropharyngeal exudate.   Eyes: Conjunctivae are normal. Pupils are equal, round, and reactive to light.   Neck: Normal range of motion. Neck supple.   Cardiovascular: Normal  rate, regular rhythm, normal heart sounds and intact distal pulses.    No murmur heard.  Pulmonary/Chest: Effort normal and breath sounds normal. No respiratory distress. He has no wheezes. He has no rales. He exhibits no tenderness.   Abdominal: Soft. Bowel sounds are normal. He exhibits no distension and no mass. There is no hepatosplenomegaly or splenomegaly. There is no tenderness. There is no rebound and no guarding. No hernia.   Musculoskeletal: Normal range of motion. He exhibits no edema or tenderness.   NROM in all major joints   Neurological: He is alert and oriented to person, place, and time. Coordination and gait normal.   Skin: Skin is warm and dry. No rash noted.   Psychiatric: He has a normal mood and affect. His behavior is normal. Judgment and thought content normal.   Nursing note and vitals reviewed.      Assessment/Plan   Jonas was seen today for hypertension.    Diagnoses and all orders for this visit:    Essential hypertension  -     CBC (No Diff); Future  -     Comprehensive Metabolic Panel; Future    Mixed hyperlipidemia  -     Comprehensive Metabolic Panel; Future  -     Lipid Panel; Future    Gastroesophageal reflux disease without esophagitis    Autonomic epileptic seizures    Patient to RTC for fasting screening labs, I will contact patient regarding test results and provide instructions regarding any necessary changes in plan of care.    Patient to continue taking medications as prescribed.    Patient was encouraged to keep me informed of any acute changes, lack of improvement, or any new concerning symptoms.    Nutrition and activity goals reviewed including: mainly water to drink, limit white flour/processed sugar, high protein, high fiber carbs, good breakfast. Encouraged to take part in low impact exercise for cardiovascular health.     Patient voiced understanding of all instructions and denied further questions.    Patient to RTC in 3 month for follow up.

## 2018-04-10 ENCOUNTER — RESULTS ENCOUNTER (OUTPATIENT)
Dept: FAMILY MEDICINE CLINIC | Facility: CLINIC | Age: 58
End: 2018-04-10

## 2018-04-10 DIAGNOSIS — I10 ESSENTIAL HYPERTENSION: ICD-10-CM

## 2018-04-10 DIAGNOSIS — E78.2 MIXED HYPERLIPIDEMIA: ICD-10-CM

## 2018-04-11 ENCOUNTER — TELEPHONE (OUTPATIENT)
Dept: FAMILY MEDICINE CLINIC | Facility: CLINIC | Age: 58
End: 2018-04-11

## 2018-04-11 NOTE — TELEPHONE ENCOUNTER
----- Message from Jonas Terrazas sent at 4/9/2018 12:43 AM EDT -----  Regarding: Prescription Question  Contact: [phone]  Judi Pelayo and Daja,     I am writing to have my prescription of Lorazepam scheduled for refill. It was last filled on March 13th at [facility] here in [place]. My last dose is Wednesday, April 11 th.    Thank you, in advance,    Jonas Terrazas  [address] [place] [address]    Email: [email or web address]  Mobile phone: [phone]

## 2018-04-12 ENCOUNTER — TELEPHONE (OUTPATIENT)
Dept: FAMILY MEDICINE CLINIC | Facility: CLINIC | Age: 58
End: 2018-04-12

## 2018-04-12 DIAGNOSIS — G40.909 SEIZURE DISORDER (HCC): Primary | ICD-10-CM

## 2018-04-12 RX ORDER — LORAZEPAM 2 MG/1
2 TABLET ORAL 2 TIMES DAILY PRN
Qty: 60 TABLET | Refills: 0 | Status: SHIPPED | OUTPATIENT
Start: 2018-04-12 | End: 2018-05-10 | Stop reason: SDUPTHER

## 2018-05-01 LAB
ALBUMIN SERPL-MCNC: 4.2 G/DL (ref 3.5–5)
ALBUMIN/GLOB SERPL: 1.2 G/DL (ref 1–2)
ALP SERPL-CCNC: 86 U/L (ref 38–126)
ALT SERPL-CCNC: 44 U/L (ref 13–69)
AST SERPL-CCNC: 35 U/L (ref 15–46)
BILIRUB SERPL-MCNC: 0.3 MG/DL (ref 0.2–1.3)
BUN SERPL-MCNC: 14 MG/DL (ref 7–20)
BUN/CREAT SERPL: 15.6 (ref 6.3–21.9)
CALCIUM SERPL-MCNC: 9.3 MG/DL (ref 8.4–10.2)
CHLORIDE SERPL-SCNC: 101 MMOL/L (ref 98–107)
CHOLEST SERPL-MCNC: 215 MG/DL (ref 0–199)
CO2 SERPL-SCNC: 28 MMOL/L (ref 26–30)
CREAT SERPL-MCNC: 0.9 MG/DL (ref 0.6–1.3)
ERYTHROCYTE [DISTWIDTH] IN BLOOD BY AUTOMATED COUNT: 13.2 % (ref 11.5–14.5)
GFR SERPLBLD CREATININE-BSD FMLA CKD-EPI: 105 ML/MIN/1.73
GFR SERPLBLD CREATININE-BSD FMLA CKD-EPI: 87 ML/MIN/1.73
GLOBULIN SER CALC-MCNC: 3.5 GM/DL
GLUCOSE SERPL-MCNC: 102 MG/DL (ref 74–98)
HCT VFR BLD AUTO: 38.6 % (ref 42–52)
HDLC SERPL-MCNC: 43 MG/DL (ref 40–60)
HGB BLD-MCNC: 12.9 G/DL (ref 14–18)
LDLC SERPL CALC-MCNC: 152 MG/DL (ref 0–99)
MCH RBC QN AUTO: 32.2 PG (ref 27–31)
MCHC RBC AUTO-ENTMCNC: 33.4 G/DL (ref 30–37)
MCV RBC AUTO: 96.3 FL (ref 80–94)
PLATELET # BLD AUTO: 346 10*3/MM3 (ref 130–400)
POTASSIUM SERPL-SCNC: 4.2 MMOL/L (ref 3.5–5.1)
PROT SERPL-MCNC: 7.7 G/DL (ref 6.3–8.2)
RBC # BLD AUTO: 4.01 10*6/MM3 (ref 4.7–6.1)
SODIUM SERPL-SCNC: 143 MMOL/L (ref 137–145)
TRIGL SERPL-MCNC: 98 MG/DL
VLDLC SERPL CALC-MCNC: 19.6 MG/DL
WBC # BLD AUTO: 6.48 10*3/MM3 (ref 4.8–10.8)

## 2018-05-08 LAB
Lab: NORMAL
VIT B12 SERPL-MCNC: 381 PG/ML (ref 239–931)
WRITTEN AUTHORIZATION: NORMAL

## 2018-05-09 ENCOUNTER — TELEPHONE (OUTPATIENT)
Dept: FAMILY MEDICINE CLINIC | Facility: CLINIC | Age: 58
End: 2018-05-09

## 2018-05-09 NOTE — TELEPHONE ENCOUNTER
----- Message from Jonas Terrazas sent at 5/8/2018  7:33 PM EDT -----  Regarding: Prescription Question  Contact: [phone]  Judi Pelayo and Daja,     I am writing to have my prescription of Lorazepam scheduled for refill.  It was last filled on April 13th at [facility] here in [place].  My last dose is Friday,  May 11th.    Thank you, in advance,    Jonas Terrazas  [address] [place] [address]    Email: [email or web address]  Mobile phone: [phone]

## 2018-05-10 ENCOUNTER — TELEPHONE (OUTPATIENT)
Dept: FAMILY MEDICINE CLINIC | Facility: CLINIC | Age: 58
End: 2018-05-10

## 2018-05-10 DIAGNOSIS — G40.909 SEIZURE DISORDER (HCC): ICD-10-CM

## 2018-05-10 RX ORDER — LORAZEPAM 2 MG/1
2 TABLET ORAL 2 TIMES DAILY PRN
Qty: 60 TABLET | Refills: 0 | Status: SHIPPED | OUTPATIENT
Start: 2018-05-10 | End: 2018-06-11 | Stop reason: SDUPTHER

## 2018-05-10 NOTE — TELEPHONE ENCOUNTER
----- Message from Jonas Terrazas sent at 5/10/2018  1:18 PM EDT -----  Regarding: RE: Test Results Question  Contact: [phone]  Thank you!  ----- Message -----  From: DAVID Wilcox  Sent: 5/10/2018  1:12 PM EDT  To: Jonas Terrazas  Subject: RE: Test Results Question  The abnormality with the CBC was very minimal, and showed could be a possible Vit B 12 def, but the level is normal so everything is ok    ----- Message -----     From: Jonas Terrazas     Sent: 5/8/2018  7:28 PM EDT       To: DAVID Wilcox  Subject: Test Results Question    Moonshital Gita, and Daja,     I saw that the B12 test came back within range.  Although It was on the low side of the scale.  Reference the CBC - listed items are out of range.  Could they be related to a vitamin deficiency somewhere?    When I lived in [place] [place] before moving here my last GP Doctor was ERA SHAIKH MD.  He did a lot of blood work and tests.  Including CBC shortly before we sold our house. Attached are screenshots of the CBC tests as well as a list of all tests over the years.     If you want to login and look at all my past tests - here is my login information:    Username: [email or web address]  Password: [email or web address]  [email or web address]    Thank you, for your follow up work.    Jonas

## 2018-06-11 ENCOUNTER — TELEPHONE (OUTPATIENT)
Dept: FAMILY MEDICINE CLINIC | Facility: CLINIC | Age: 58
End: 2018-06-11

## 2018-06-11 DIAGNOSIS — G40.909 SEIZURE DISORDER (HCC): ICD-10-CM

## 2018-06-11 RX ORDER — LORAZEPAM 2 MG/1
2 TABLET ORAL 2 TIMES DAILY PRN
Qty: 60 TABLET | Refills: 0 | Status: SHIPPED | OUTPATIENT
Start: 2018-06-11 | End: 2018-07-12 | Stop reason: SDUPTHER

## 2018-06-11 NOTE — TELEPHONE ENCOUNTER
----- Message from Jonas Terrazas sent at 6/8/2018  3:13 PM EDT -----  Regarding: Prescription Question  Contact: [phone]  Judi Pelayo and Daja,     I am writing to have my prescription of Lorazepam scheduled for refill.  It was last filled on  May 13th at [facility] here in [place]. My last dose is Tuesday, June 12th.      I will see you at my July 5th appointment.    Thank you, in advance,    Jonas Terrazas  [address] [place] [address]    Email: [email or web address]  Mobile phone: [phone]

## 2018-07-05 ENCOUNTER — OFFICE VISIT (OUTPATIENT)
Dept: FAMILY MEDICINE CLINIC | Facility: CLINIC | Age: 58
End: 2018-07-05

## 2018-07-05 VITALS
TEMPERATURE: 96 F | SYSTOLIC BLOOD PRESSURE: 120 MMHG | HEIGHT: 65 IN | OXYGEN SATURATION: 97 % | DIASTOLIC BLOOD PRESSURE: 80 MMHG | WEIGHT: 147 LBS | BODY MASS INDEX: 24.49 KG/M2

## 2018-07-05 DIAGNOSIS — I10 ESSENTIAL HYPERTENSION: ICD-10-CM

## 2018-07-05 DIAGNOSIS — E78.2 MIXED HYPERLIPIDEMIA: ICD-10-CM

## 2018-07-05 DIAGNOSIS — K21.9 GASTROESOPHAGEAL REFLUX DISEASE WITHOUT ESOPHAGITIS: ICD-10-CM

## 2018-07-05 DIAGNOSIS — G40.909 SEIZURE DISORDER (HCC): ICD-10-CM

## 2018-07-05 DIAGNOSIS — D53.9 MACROCYTIC ANEMIA: ICD-10-CM

## 2018-07-05 PROCEDURE — 99214 OFFICE O/P EST MOD 30 MIN: CPT | Performed by: NURSE PRACTITIONER

## 2018-07-05 RX ORDER — LORAZEPAM 2 MG/1
2 TABLET ORAL 2 TIMES DAILY PRN
Qty: 60 TABLET | Refills: 0 | Status: CANCELLED | OUTPATIENT
Start: 2018-07-05

## 2018-07-05 RX ORDER — LISINOPRIL 20 MG/1
20 TABLET ORAL DAILY
Qty: 90 TABLET | Refills: 0 | Status: SHIPPED | OUTPATIENT
Start: 2018-07-05 | End: 2018-10-02 | Stop reason: SDUPTHER

## 2018-07-05 NOTE — PROGRESS NOTES
Subjective   Jonas Terrazas is a 57 y.o. male.     Patient comes to clinic today for 3 month follow up regarding HTN, Seizures, HLD and GERD     HLD- Currently controlled with diet, patient states he is making better food choices and reducing fat in his diet.     HTN- Currently controlled with Lisinopril, not having any adverse effects from the medication.      Seizures- Currently takes Ativan to control seizures and has not had once in over 2 years, denies any adverse effects from medication.      GERD- Currently controlled with Prilosec and Pepcid, denies any adverse effects from medications.     Chronic pain syndrome/chronic lumbago with neuropathy-Goes to a pain clinic for chronic pain/pain pump from injury sustained several years ago on the job as a .     Macrocytic Anemia- Patient has started taking an OTC B12 supplement and a daily multivitamin.     Patient concerned that he has been waking up periodically with palpitations and asked about an EKG soon. He states that his previous PCP would do EKG's annually. He states he is not having any chest pain or shortness of breath, just one or 2 times a week, will have some palpitations for a few seconds, then it just goes away.          The following portions of the patient's history were reviewed and updated as appropriate: allergies, current medications, past family history, past medical history, past social history, past surgical history and problem list.    Review of Systems   Constitutional: Negative.    HENT: Negative.    Eyes: Negative.    Respiratory: Negative.    Cardiovascular: Positive for palpitations.   Gastrointestinal: Negative.    Endocrine: Negative.    Genitourinary: Negative.    Musculoskeletal: Negative.    Skin: Negative.    Allergic/Immunologic: Negative.    Neurological: Negative for dizziness, syncope, weakness and numbness.   Hematological: Negative for adenopathy.   Psychiatric/Behavioral: Negative for confusion and suicidal  ideas. The patient is not nervous/anxious.        Objective   Physical Exam   Constitutional: He is oriented to person, place, and time. He appears well-developed and well-nourished. No distress.   HENT:   Head: Normocephalic.   Right Ear: External ear normal.   Left Ear: External ear normal.   Nose: Nose normal.   Mouth/Throat: Oropharynx is clear and moist. No oropharyngeal exudate.   Eyes: Conjunctivae are normal. Pupils are equal, round, and reactive to light.   Neck: Normal range of motion. Neck supple. No tracheal deviation present. No thyromegaly present.   Cardiovascular: Normal rate, regular rhythm, normal heart sounds and intact distal pulses.    No murmur heard.  Pulmonary/Chest: Effort normal and breath sounds normal. No respiratory distress. He has no wheezes. He has no rales. He exhibits no tenderness.   Abdominal: Soft. Bowel sounds are normal. He exhibits no distension. There is no hepatosplenomegaly or splenomegaly. There is no tenderness. There is no guarding.   Musculoskeletal: Normal range of motion. He exhibits no edema or tenderness.   NROM for all major joints   Neurological: He is alert and oriented to person, place, and time. Coordination and gait normal.   Skin: Skin is warm and dry. No rash noted.   Psychiatric: He has a normal mood and affect. His behavior is normal. Judgment and thought content normal.   Nursing note and vitals reviewed.      Assessment/Plan   Jonas was seen today for follow-up and hypertension.    Diagnoses and all orders for this visit:    Mixed hyperlipidemia  -     CBC (No Diff); Future  -     Comprehensive Metabolic Panel; Future  -     Lipid Panel; Future    Essential hypertension  -     lisinopril (PRINIVIL,ZESTRIL) 20 MG tablet; Take 1 tablet by mouth Daily.  -     CBC (No Diff); Future  -     Comprehensive Metabolic Panel; Future    Seizure disorder (CMS/HCC)    Gastroesophageal reflux disease without esophagitis    Macrocytic anemia  -     CBC (No Diff);  Future    Other orders  -     Cancel: LORazepam (ATIVAN) 2 MG tablet; Take 1 tablet by mouth 2 (Two) Times a Day As Needed for Anxiety.    Refill sent for patient's Lisinopril 20 mg tablet PO daily to his preferred pharmacy. Advised patient to continue taking all his medications as previously directed.    Discussed with patient that he needed an EKG for his palpitations but patient wants to come back at a later date to have this done as he does not want to wait today. Advised him to not wait until his next 3 month follow up but to return within a month. Patient wants to return for his fasting AM labs and also his EKG. I will contact patient regarding test results and provide instructions regarding any necessary changes in plan of care. Patient was encouraged to keep me informed of any acute changes, lack of improvement, or any new concerning symptoms. Patient advised to go to the emergency room if symptoms worsen.     Patient voiced understanding of all instructions and denied further questions.      Patient to RTC in 2 weeks for follow up.

## 2018-07-10 ENCOUNTER — RESULTS ENCOUNTER (OUTPATIENT)
Dept: FAMILY MEDICINE CLINIC | Facility: CLINIC | Age: 58
End: 2018-07-10

## 2018-07-10 DIAGNOSIS — D53.9 MACROCYTIC ANEMIA: ICD-10-CM

## 2018-07-10 DIAGNOSIS — I10 ESSENTIAL HYPERTENSION: ICD-10-CM

## 2018-07-10 DIAGNOSIS — E78.2 MIXED HYPERLIPIDEMIA: ICD-10-CM

## 2018-07-12 ENCOUNTER — OFFICE VISIT (OUTPATIENT)
Dept: FAMILY MEDICINE CLINIC | Facility: CLINIC | Age: 58
End: 2018-07-12

## 2018-07-12 VITALS
HEIGHT: 65 IN | DIASTOLIC BLOOD PRESSURE: 84 MMHG | OXYGEN SATURATION: 98 % | HEART RATE: 86 BPM | WEIGHT: 148 LBS | SYSTOLIC BLOOD PRESSURE: 124 MMHG | BODY MASS INDEX: 24.66 KG/M2

## 2018-07-12 DIAGNOSIS — G40.909 SEIZURE DISORDER (HCC): ICD-10-CM

## 2018-07-12 DIAGNOSIS — R00.2 HEART PALPITATIONS: ICD-10-CM

## 2018-07-12 PROCEDURE — 99214 OFFICE O/P EST MOD 30 MIN: CPT | Performed by: NURSE PRACTITIONER

## 2018-07-12 PROCEDURE — 93000 ELECTROCARDIOGRAM COMPLETE: CPT | Performed by: NURSE PRACTITIONER

## 2018-07-12 RX ORDER — LORAZEPAM 2 MG/1
2 TABLET ORAL 2 TIMES DAILY PRN
Qty: 60 TABLET | Refills: 0 | Status: SHIPPED | OUTPATIENT
Start: 2018-07-12 | End: 2018-08-13 | Stop reason: SDUPTHER

## 2018-07-12 NOTE — PROGRESS NOTES
Maria Fernanda Terrazas is a 57 y.o. male.     Patient is here today for an EKG related to his recent heart palpitations. Patient had a full cardiac work up 3 years ago in [place], [place]. He reports having palpitations in the morning but has not experienced these since the end of June since he has started decreasing his caffeine intake.       The following portions of the patient's history were reviewed and updated as appropriate: allergies, current medications, past family history, past medical history, past social history, past surgical history and problem list.    Review of Systems   Constitutional: Negative.    HENT: Negative.    Eyes: Negative.    Respiratory: Negative.    Cardiovascular: Positive for palpitations.   Gastrointestinal: Negative.    Genitourinary: Negative.    Musculoskeletal: Negative.    Skin: Negative.    Neurological: Negative for dizziness, syncope, weakness and numbness.   Hematological: Negative for adenopathy.   Psychiatric/Behavioral: Negative for confusion and suicidal ideas. The patient is not nervous/anxious.      Objective   Physical Exam   Constitutional: He is oriented to person, place, and time. He appears well-developed and well-nourished. No distress.   HENT:   Head: Normocephalic.   Right Ear: External ear normal.   Left Ear: External ear normal.   Nose: Nose normal.   Mouth/Throat: Oropharynx is clear and moist. No oropharyngeal exudate.   Eyes: Conjunctivae are normal. Pupils are equal, round, and reactive to light.   Neck: Normal range of motion. Neck supple. No tracheal deviation present. No thyromegaly present.   Cardiovascular: Normal rate, regular rhythm, normal heart sounds and intact distal pulses.    No murmur heard.  Pulmonary/Chest: Effort normal and breath sounds normal. No respiratory distress. He has no wheezes. He has no rales. He exhibits no tenderness.   Abdominal: Soft. Bowel sounds are normal. He exhibits no distension and no mass. There is  no hepatosplenomegaly or splenomegaly. There is no tenderness. There is no rebound and no guarding.   Musculoskeletal: Normal range of motion. He exhibits no edema or tenderness.   NROM of all major joints.   Neurological: He is alert and oriented to person, place, and time. Coordination and gait normal.   Skin: Skin is warm and dry. No rash noted.   Psychiatric: He has a normal mood and affect. His behavior is normal. Judgment and thought content normal.     Vitals:    07/12/18 1523   BP: 124/84   Pulse: 86   SpO2: 98%       Assessment/Plan   Jonas was seen today for follow-up.    Diagnoses and all orders for this visit:    Heart palpitations      EKG normal in clinic today:      ECG 12 Lead  Date/Time: 7/12/2018 3:52 PM  Performed by: ZAC TRONCOSO  Authorized by: ZAC TRONCOSO   Comparison: not compared with previous ECG   Rhythm: sinus rhythm  Rate: normal  Conduction: conduction normal  ST Segments: ST segments normal  T Waves: T waves normal  QRS axis: normal  Clinical impression: normal ECG        Patient states that he will get copies of his most recent cardiac work up that was completed 3 years ago and will bring them to the office to be reviewed.    Discussed with patient that we could refer him to a cardiologist to have a 24 hour hear monitor. He is not sure if he wants to do this and would like to talk with his wife first and would be in contact with the clinic should he decide to see cardiology. He was given a copy of his EKG to take home. Encouraged patient to continue with his decreased caffeine intake.     I will contact patient regarding test results and provide instructions regarding any necessary changes in plan of care. Patient voiced understanding of all instructions and denied further questions.    Patient to return to the clinic at his regularly scheduled appointment or earlier if needed.

## 2018-08-10 ENCOUNTER — TELEPHONE (OUTPATIENT)
Dept: FAMILY MEDICINE CLINIC | Facility: CLINIC | Age: 58
End: 2018-08-10

## 2018-08-10 DIAGNOSIS — G40.909 SEIZURE DISORDER (HCC): ICD-10-CM

## 2018-08-10 RX ORDER — LORAZEPAM 2 MG/1
2 TABLET ORAL 2 TIMES DAILY PRN
Qty: 60 TABLET | Refills: 0 | Status: CANCELLED | OUTPATIENT
Start: 2018-08-10

## 2018-08-13 DIAGNOSIS — G40.909 SEIZURE DISORDER (HCC): ICD-10-CM

## 2018-08-13 RX ORDER — LORAZEPAM 2 MG/1
2 TABLET ORAL 2 TIMES DAILY PRN
Qty: 60 TABLET | Refills: 0 | Status: SHIPPED | OUTPATIENT
Start: 2018-08-13 | End: 2018-09-10 | Stop reason: SDUPTHER

## 2018-08-14 RX ORDER — LORAZEPAM 2 MG/1
TABLET ORAL
Qty: 60 TABLET | Refills: 0 | OUTPATIENT
Start: 2018-08-14

## 2018-09-10 DIAGNOSIS — G40.909 SEIZURE DISORDER (HCC): ICD-10-CM

## 2018-09-10 RX ORDER — LORAZEPAM 2 MG/1
2 TABLET ORAL 2 TIMES DAILY PRN
Qty: 60 TABLET | Refills: 0 | Status: SHIPPED | OUTPATIENT
Start: 2018-09-10 | End: 2018-10-08 | Stop reason: SDUPTHER

## 2018-10-02 DIAGNOSIS — I10 ESSENTIAL HYPERTENSION: ICD-10-CM

## 2018-10-02 RX ORDER — LISINOPRIL 20 MG/1
20 TABLET ORAL DAILY
Qty: 90 TABLET | Refills: 0 | Status: SHIPPED | OUTPATIENT
Start: 2018-10-02 | End: 2018-12-31 | Stop reason: SDUPTHER

## 2018-10-08 ENCOUNTER — TELEPHONE (OUTPATIENT)
Dept: FAMILY MEDICINE CLINIC | Facility: CLINIC | Age: 58
End: 2018-10-08

## 2018-10-08 DIAGNOSIS — G40.909 SEIZURE DISORDER (HCC): ICD-10-CM

## 2018-10-08 RX ORDER — LORAZEPAM 2 MG/1
2 TABLET ORAL 2 TIMES DAILY PRN
Qty: 60 TABLET | Refills: 0 | Status: SHIPPED | OUTPATIENT
Start: 2018-10-08 | End: 2018-11-05 | Stop reason: SDUPTHER

## 2018-10-08 NOTE — TELEPHONE ENCOUNTER
----- Message from Jonas Terrazas sent at 10/8/2018 10:37 AM EDT -----  Regarding: Prescription Question  Contact: [phone]  Judi Pelayo,     I am writing to have my prescription of Lorazepam scheduled for refill.  It was last filled on September 10th at [facility] here in [place].     Thank you, in advance,    Jonas Terrazas  [address] [place] [address]    Email: [email or web address]  Mobile phone: [phone]

## 2018-10-16 ENCOUNTER — HOSPITAL ENCOUNTER (EMERGENCY)
Facility: HOSPITAL | Age: 58
Discharge: HOME | End: 2018-10-16
Attending: EMERGENCY MEDICINE | Admitting: EMERGENCY MEDICINE

## 2018-10-16 ENCOUNTER — APPOINTMENT (OUTPATIENT)
Dept: CT IMAGING | Facility: HOSPITAL | Age: 58
End: 2018-10-16

## 2018-10-16 VITALS
HEART RATE: 100 BPM | BODY MASS INDEX: 22.69 KG/M2 | DIASTOLIC BLOOD PRESSURE: 95 MMHG | WEIGHT: 141.2 LBS | SYSTOLIC BLOOD PRESSURE: 160 MMHG | TEMPERATURE: 98 F | HEIGHT: 66 IN | RESPIRATION RATE: 18 BRPM | OXYGEN SATURATION: 98 %

## 2018-10-16 DIAGNOSIS — K62.5 BRBPR (BRIGHT RED BLOOD PER RECTUM): ICD-10-CM

## 2018-10-16 DIAGNOSIS — K52.9 COLITIS: Primary | ICD-10-CM

## 2018-10-16 LAB
ALBUMIN SERPL-MCNC: 4.2 G/DL (ref 3.5–5)
ALBUMIN/GLOB SERPL: 1.4 G/DL (ref 1–2)
ALP SERPL-CCNC: 74 U/L (ref 38–126)
ALT SERPL W P-5'-P-CCNC: 38 U/L (ref 13–69)
ANION GAP SERPL CALCULATED.3IONS-SCNC: 11 MMOL/L (ref 10–20)
AST SERPL-CCNC: 27 U/L (ref 15–46)
BACTERIA UR QL AUTO: ABNORMAL /HPF
BASOPHILS # BLD AUTO: 0.04 10*3/MM3 (ref 0–0.2)
BASOPHILS NFR BLD AUTO: 0.2 % (ref 0–2.5)
BILIRUB SERPL-MCNC: 0.5 MG/DL (ref 0.2–1.3)
BILIRUB UR QL STRIP: NEGATIVE
BUN BLD-MCNC: 14 MG/DL (ref 7–20)
BUN/CREAT SERPL: 14 (ref 6.3–21.9)
CALCIUM SPEC-SCNC: 9.2 MG/DL (ref 8.4–10.2)
CHLORIDE SERPL-SCNC: 104 MMOL/L (ref 98–107)
CLARITY UR: CLEAR
CO2 SERPL-SCNC: 25 MMOL/L (ref 26–30)
COLOR UR: YELLOW
CREAT BLD-MCNC: 1 MG/DL (ref 0.6–1.3)
D-LACTATE SERPL-SCNC: 0.7 MMOL/L (ref 0.5–2)
DEPRECATED RDW RBC AUTO: 43.5 FL (ref 37–54)
EOSINOPHIL # BLD AUTO: 0.08 10*3/MM3 (ref 0–0.7)
EOSINOPHIL NFR BLD AUTO: 0.5 % (ref 0–7)
ERYTHROCYTE [DISTWIDTH] IN BLOOD BY AUTOMATED COUNT: 12.4 % (ref 11.5–14.5)
GFR SERPL CREATININE-BSD FRML MDRD: 77 ML/MIN/1.73
GLOBULIN UR ELPH-MCNC: 3.1 GM/DL
GLUCOSE BLD-MCNC: 113 MG/DL (ref 74–98)
GLUCOSE UR STRIP-MCNC: NEGATIVE MG/DL
HCT VFR BLD AUTO: 38.9 % (ref 42–52)
HGB BLD-MCNC: 13.2 G/DL (ref 14–18)
HGB UR QL STRIP.AUTO: ABNORMAL
HYALINE CASTS UR QL AUTO: ABNORMAL /LPF
IMM GRANULOCYTES # BLD: 0.05 10*3/MM3 (ref 0–0.06)
IMM GRANULOCYTES NFR BLD: 0.3 % (ref 0–0.6)
KETONES UR QL STRIP: NEGATIVE
LEUKOCYTE ESTERASE UR QL STRIP.AUTO: NEGATIVE
LIPASE SERPL-CCNC: 80 U/L (ref 23–300)
LYMPHOCYTES # BLD AUTO: 1.95 10*3/MM3 (ref 0.6–3.4)
LYMPHOCYTES NFR BLD AUTO: 12 % (ref 10–50)
MCH RBC QN AUTO: 32.4 PG (ref 27–31)
MCHC RBC AUTO-ENTMCNC: 33.9 G/DL (ref 30–37)
MCV RBC AUTO: 95.6 FL (ref 80–94)
MONOCYTES # BLD AUTO: 0.95 10*3/MM3 (ref 0–0.9)
MONOCYTES NFR BLD AUTO: 5.9 % (ref 0–12)
NEUTROPHILS # BLD AUTO: 13.12 10*3/MM3 (ref 2–6.9)
NEUTROPHILS NFR BLD AUTO: 81.1 % (ref 37–80)
NITRITE UR QL STRIP: NEGATIVE
NRBC BLD MANUAL-RTO: 0 /100 WBC (ref 0–0)
PH UR STRIP.AUTO: 5.5 [PH] (ref 5–8)
PLATELET # BLD AUTO: 282 10*3/MM3 (ref 130–400)
PMV BLD AUTO: 8.8 FL (ref 6–12)
POTASSIUM BLD-SCNC: 4 MMOL/L (ref 3.5–5.1)
PROT SERPL-MCNC: 7.3 G/DL (ref 6.3–8.2)
PROT UR QL STRIP: NEGATIVE
RBC # BLD AUTO: 4.07 10*6/MM3 (ref 4.7–6.1)
RBC # UR: ABNORMAL /HPF
REF LAB TEST METHOD: ABNORMAL
SODIUM BLD-SCNC: 136 MMOL/L (ref 137–145)
SP GR UR STRIP: 1.01 (ref 1–1.03)
SQUAMOUS #/AREA URNS HPF: ABNORMAL /HPF
UROBILINOGEN UR QL STRIP: ABNORMAL
WBC NRBC COR # BLD: 16.19 10*3/MM3 (ref 4.8–10.8)
WBC UR QL AUTO: ABNORMAL /HPF

## 2018-10-16 PROCEDURE — 80053 COMPREHEN METABOLIC PANEL: CPT | Performed by: EMERGENCY MEDICINE

## 2018-10-16 PROCEDURE — 96361 HYDRATE IV INFUSION ADD-ON: CPT

## 2018-10-16 PROCEDURE — 25010000002 MORPHINE PER 10 MG: Performed by: EMERGENCY MEDICINE

## 2018-10-16 PROCEDURE — 74177 CT ABD & PELVIS W/CONTRAST: CPT

## 2018-10-16 PROCEDURE — 96375 TX/PRO/DX INJ NEW DRUG ADDON: CPT

## 2018-10-16 PROCEDURE — 96374 THER/PROPH/DIAG INJ IV PUSH: CPT

## 2018-10-16 PROCEDURE — 81001 URINALYSIS AUTO W/SCOPE: CPT | Performed by: EMERGENCY MEDICINE

## 2018-10-16 PROCEDURE — 83605 ASSAY OF LACTIC ACID: CPT | Performed by: EMERGENCY MEDICINE

## 2018-10-16 PROCEDURE — 99283 EMERGENCY DEPT VISIT LOW MDM: CPT

## 2018-10-16 PROCEDURE — 25010000002 ONDANSETRON PER 1 MG: Performed by: EMERGENCY MEDICINE

## 2018-10-16 PROCEDURE — 83690 ASSAY OF LIPASE: CPT | Performed by: EMERGENCY MEDICINE

## 2018-10-16 PROCEDURE — 85025 COMPLETE CBC W/AUTO DIFF WBC: CPT | Performed by: EMERGENCY MEDICINE

## 2018-10-16 PROCEDURE — 25010000002 IOPAMIDOL 61 % SOLUTION: Performed by: EMERGENCY MEDICINE

## 2018-10-16 RX ORDER — CIPROFLOXACIN 500 MG/1
500 TABLET, FILM COATED ORAL ONCE
Status: COMPLETED | OUTPATIENT
Start: 2018-10-16 | End: 2018-10-16

## 2018-10-16 RX ORDER — LEVOFLOXACIN 750 MG/1
750 TABLET ORAL DAILY
Qty: 10 TABLET | Refills: 0 | Status: SHIPPED | OUTPATIENT
Start: 2018-10-16 | End: 2019-09-12

## 2018-10-16 RX ORDER — DICYCLOMINE HYDROCHLORIDE 10 MG/1
10 CAPSULE ORAL 3 TIMES DAILY
Qty: 20 CAPSULE | Refills: 0 | Status: SHIPPED | OUTPATIENT
Start: 2018-10-16 | End: 2019-09-12

## 2018-10-16 RX ORDER — ONDANSETRON 2 MG/ML
4 INJECTION INTRAMUSCULAR; INTRAVENOUS ONCE
Status: COMPLETED | OUTPATIENT
Start: 2018-10-16 | End: 2018-10-16

## 2018-10-16 RX ORDER — METRONIDAZOLE 500 MG/1
500 TABLET ORAL ONCE
Status: COMPLETED | OUTPATIENT
Start: 2018-10-16 | End: 2018-10-16

## 2018-10-16 RX ORDER — METRONIDAZOLE 500 MG/1
500 TABLET ORAL 3 TIMES DAILY
Qty: 30 TABLET | Refills: 0 | Status: SHIPPED | OUTPATIENT
Start: 2018-10-16 | End: 2018-10-26

## 2018-10-16 RX ORDER — MORPHINE SULFATE 4 MG/ML
4 INJECTION, SOLUTION INTRAMUSCULAR; INTRAVENOUS ONCE
Status: COMPLETED | OUTPATIENT
Start: 2018-10-16 | End: 2018-10-16

## 2018-10-16 RX ADMIN — SODIUM CHLORIDE 1000 ML: 9 INJECTION, SOLUTION INTRAVENOUS at 07:41

## 2018-10-16 RX ADMIN — IOPAMIDOL 100 ML: 612 INJECTION, SOLUTION INTRAVENOUS at 08:39

## 2018-10-16 RX ADMIN — ONDANSETRON 4 MG: 2 INJECTION INTRAMUSCULAR; INTRAVENOUS at 07:43

## 2018-10-16 RX ADMIN — CIPROFLOXACIN 500 MG: 500 TABLET, FILM COATED ORAL at 09:36

## 2018-10-16 RX ADMIN — METRONIDAZOLE 500 MG: 500 TABLET ORAL at 09:36

## 2018-10-16 RX ADMIN — MORPHINE SULFATE 4 MG: 4 INJECTION, SOLUTION INTRAMUSCULAR; INTRAVENOUS at 08:49

## 2018-10-16 NOTE — ED PROVIDER NOTES
Subjective   57-year-old male presents to the ED with chief complaint of bright red blood per rectum.  Patient states that it started last night at approximately 10 PM.  He states that he is having a small amount of bright red blood with every stool.  Patient notes that he initially developed some diarrhea over the last few bowel movements.  He complains of diffuse generalized abdominal pain that is much worse in the bilateral lower quadrants.  He complains of nausea without vomiting.  No fever or chills.  No chest pain or shortness of breath.  No prior treatments or alleviating factors.  Patient does have a past medical history significant for diverticulitis with associated diverticular bleeding.  No other complaints at this time.            Review of Systems   Constitutional: Negative for fatigue and fever.   Gastrointestinal: Positive for abdominal pain, anal bleeding, blood in stool, diarrhea and nausea. Negative for vomiting.   All other systems reviewed and are negative.      Past Medical History:   Diagnosis Date   • Diverticulitis    • GERD (gastroesophageal reflux disease)    • Hyperlipidemia    • Hypertension    • Seizures (CMS/HCC)        No Known Allergies    Past Surgical History:   Procedure Laterality Date   • COLONOSCOPY     • PAIN PUMP INSERTION/REVISION      1999   • VASECTOMY      1992       Family History   Problem Relation Age of Onset   • Alzheimer's disease Mother    • Heart attack Father    • Hypertension Father        Social History     Social History   • Marital status:      Social History Main Topics   • Smoking status: Never Smoker   • Smokeless tobacco: Never Used   • Alcohol use No   • Drug use: No     Other Topics Concern   • Not on file           Objective   Physical Exam   Constitutional: He is oriented to person, place, and time. No distress.   HENT:   Head: Normocephalic and atraumatic.   Nose: Nose normal.   Eyes: Pupils are equal, round, and reactive to light. Conjunctivae  and EOM are normal.   Cardiovascular: Normal rate and regular rhythm.    Pulmonary/Chest: Effort normal and breath sounds normal. No respiratory distress.   Abdominal: Soft. He exhibits no distension. There is tenderness.   Diffuse generalized abdominal tenderness to palpation.   Musculoskeletal: He exhibits no edema or deformity.   Neurological: He is alert and oriented to person, place, and time. No cranial nerve deficit. Coordination normal.   Skin: He is not diaphoretic.   Nursing note and vitals reviewed.      Procedures           ED Course      Patient presented with bright red blood per rectum and generalized abdominal pain.  Laboratory results show slightly elevated WBC.  CT scan shows colitis which is likely the source of the GI bleeding.  Hemoglobin is stable.  Patient's vital signs are stable.  He is afebrile without any significant not to her vomiting while in the department.  Patient was tolerating by mouth intake.  Discussed possible admission versus outpatient treatment.  At this time patient elects for outpatient therapy with antibiotics and anti-medics.  Strict return precautions given, Tam worsening of pain, fever, nausea vomiting or worsening diarrhea.  Patient is agreeable to this plan.            MDM      Final diagnoses:   Colitis   BRBPR (bright red blood per rectum)            Tam Petersen, DO  10/17/18 0709

## 2018-11-05 DIAGNOSIS — G40.909 SEIZURE DISORDER (HCC): ICD-10-CM

## 2018-11-05 RX ORDER — LORAZEPAM 2 MG/1
2 TABLET ORAL 2 TIMES DAILY PRN
Qty: 60 TABLET | Refills: 0 | Status: SHIPPED | OUTPATIENT
Start: 2018-11-05 | End: 2018-12-03 | Stop reason: SDUPTHER

## 2018-12-03 ENCOUNTER — OFFICE VISIT (OUTPATIENT)
Dept: FAMILY MEDICINE CLINIC | Facility: CLINIC | Age: 58
End: 2018-12-03

## 2018-12-03 VITALS
BODY MASS INDEX: 22.34 KG/M2 | HEIGHT: 66 IN | SYSTOLIC BLOOD PRESSURE: 142 MMHG | DIASTOLIC BLOOD PRESSURE: 88 MMHG | HEART RATE: 90 BPM | WEIGHT: 139 LBS | OXYGEN SATURATION: 97 %

## 2018-12-03 DIAGNOSIS — G89.4 CHRONIC PAIN SYNDROME: ICD-10-CM

## 2018-12-03 DIAGNOSIS — G40.109 AUTONOMIC EPILEPTIC SEIZURES (HCC): ICD-10-CM

## 2018-12-03 DIAGNOSIS — I10 ESSENTIAL HYPERTENSION: ICD-10-CM

## 2018-12-03 DIAGNOSIS — Z87.828 HISTORY OF SPINAL CORD INJURY: ICD-10-CM

## 2018-12-03 DIAGNOSIS — E78.2 MIXED HYPERLIPIDEMIA: ICD-10-CM

## 2018-12-03 DIAGNOSIS — K21.9 GASTROESOPHAGEAL REFLUX DISEASE WITHOUT ESOPHAGITIS: ICD-10-CM

## 2018-12-03 DIAGNOSIS — G40.909 SEIZURE DISORDER (HCC): ICD-10-CM

## 2018-12-03 DIAGNOSIS — D51.8 MACROCYTIC ANEMIA WITH VITAMIN B12 DEFICIENCY: ICD-10-CM

## 2018-12-03 PROCEDURE — 99214 OFFICE O/P EST MOD 30 MIN: CPT | Performed by: NURSE PRACTITIONER

## 2018-12-03 RX ORDER — LORAZEPAM 2 MG/1
2 TABLET ORAL 2 TIMES DAILY PRN
Qty: 60 TABLET | Refills: 0 | Status: SHIPPED | OUTPATIENT
Start: 2018-12-03 | End: 2018-12-31 | Stop reason: SDUPTHER

## 2018-12-03 RX ORDER — LIDOCAINE HYDROCHLORIDE 10 MG/ML
INJECTION, SOLUTION INFILTRATION; PERINEURAL
COMMUNITY
Start: 2018-09-10 | End: 2019-09-12

## 2018-12-03 RX ORDER — NALOXONE HYDROCHLORIDE 4 MG/.1ML
SPRAY NASAL
Refills: 0 | COMMUNITY
Start: 2018-11-19

## 2018-12-03 NOTE — PROGRESS NOTES
Subjective   Jonas Terrazas is a 57 y.o. male.     Patient comes to clinic today for 3 month follow up regarding HTN, Seizures, HLD and GERD     HLD- Currently controlled with diet, patient states he is making better food choices and reducing fat in his diet.     HTN- Currently controlled with Lisinopril, not having any adverse effects from the medication.      Seizures- Currently takes Ativan to control seizures and has not had once in over 2 years, denies any adverse effects from medication.      GERD- Currently controlled with Prilosec and Pepcid, denies any adverse effects from medications.     Chronic pain syndrome/chronic lumbago with neuropathy-Goes to a pain clinic for chronic pain/pain pump from injury sustained several years ago on the job as a .     Macrocytic Anemia- Patient has started taking an OTC B12 supplement and a daily multivitamin.     He had a diverticulitis flare in October, went to the ED for it. He was treated and it resolved and he is doing well now.       Hyperlipidemia     Hypertension   Associated symptoms include palpitations.   Seizures    Pertinent negatives include no confusion.        The following portions of the patient's history were reviewed and updated as appropriate: allergies, current medications, past family history, past medical history, past social history, past surgical history and problem list.    Review of Systems   Constitutional: Negative.    HENT: Negative.    Eyes: Negative.    Respiratory: Negative.    Cardiovascular: Positive for palpitations.   Gastrointestinal: Negative.    Endocrine: Negative.    Genitourinary: Negative.    Musculoskeletal: Negative.    Skin: Negative.    Allergic/Immunologic: Negative.    Neurological: Positive for seizures. Negative for dizziness, syncope, weakness and numbness.   Hematological: Negative for adenopathy.   Psychiatric/Behavioral: Negative for confusion and suicidal ideas. The patient is not nervous/anxious.         Objective   Physical Exam   Constitutional: He is oriented to person, place, and time. He appears well-developed and well-nourished. No distress.   HENT:   Head: Normocephalic.   Right Ear: External ear normal.   Left Ear: External ear normal.   Nose: Nose normal.   Mouth/Throat: Oropharynx is clear and moist. No oropharyngeal exudate.   Eyes: Conjunctivae are normal. Pupils are equal, round, and reactive to light.   Neck: Normal range of motion. Neck supple. No tracheal deviation present. No thyromegaly present.   Cardiovascular: Normal rate, regular rhythm, normal heart sounds and intact distal pulses.   No murmur heard.  Pulmonary/Chest: Effort normal and breath sounds normal. No respiratory distress. He has no wheezes. He has no rales. He exhibits no tenderness.   Abdominal: Soft. Bowel sounds are normal. He exhibits no distension. There is no hepatosplenomegaly or splenomegaly. There is no tenderness. There is no guarding.   Musculoskeletal: Normal range of motion. He exhibits no edema or tenderness.   NROM for all major joints   Neurological: He is alert and oriented to person, place, and time. Coordination and gait normal.   Skin: Skin is warm and dry. No rash noted.   Psychiatric: He has a normal mood and affect. His behavior is normal. Judgment and thought content normal.   Nursing note and vitals reviewed.      Assessment/Plan   Jonas was seen today for hyperlipidemia, hypertension and seizures.    Diagnoses and all orders for this visit:    Mixed hyperlipidemia  -     Lipid Panel; Future    Essential hypertension    Autonomic epileptic seizures (CMS/HCC)    Gastroesophageal reflux disease without esophagitis    Chronic pain syndrome    History of spinal cord injury    Macrocytic anemia with vitamin B12 deficiency    Hyperlipidemia controlled with diet. FLP ordered today, patient to RTC fasting. I will contact patient regarding test results and provide instructions regarding any necessary changes in  plan of care.    Refill sent for patient's Lisinopril 20 mg tablet PO daily to his preferred pharmacy. Advised patient to continue taking all his medications as previously directed.    No recent seizure activity.    GERD controlled Prilosec.    Continue to follow Pain Management as directed.     Continue OTC B12.    Patient was encouraged to keep me informed of any acute changes, lack of improvement, or any new concerning symptoms.    Patient voiced understanding of all instructions and denied further questions.      Patient to RTC in 3 months for follow up and prn.

## 2018-12-08 ENCOUNTER — RESULTS ENCOUNTER (OUTPATIENT)
Dept: FAMILY MEDICINE CLINIC | Facility: CLINIC | Age: 58
End: 2018-12-08

## 2018-12-08 DIAGNOSIS — E78.2 MIXED HYPERLIPIDEMIA: ICD-10-CM

## 2018-12-19 LAB
CHOLEST SERPL-MCNC: 187 MG/DL (ref 0–199)
HDLC SERPL-MCNC: 38 MG/DL (ref 40–60)
LDLC SERPL CALC-MCNC: 118 MG/DL (ref 0–99)
TRIGL SERPL-MCNC: 156 MG/DL
VLDLC SERPL CALC-MCNC: 31.2 MG/DL

## 2018-12-31 DIAGNOSIS — I10 ESSENTIAL HYPERTENSION: ICD-10-CM

## 2018-12-31 DIAGNOSIS — G40.909 SEIZURE DISORDER (HCC): ICD-10-CM

## 2019-01-02 RX ORDER — LISINOPRIL 20 MG/1
20 TABLET ORAL DAILY
Qty: 90 TABLET | Refills: 0 | Status: SHIPPED | OUTPATIENT
Start: 2019-01-02 | End: 2019-03-27 | Stop reason: SDUPTHER

## 2019-01-02 RX ORDER — LORAZEPAM 2 MG/1
2 TABLET ORAL 2 TIMES DAILY PRN
Qty: 60 TABLET | Refills: 0 | Status: SHIPPED | OUTPATIENT
Start: 2019-01-02 | End: 2019-01-31 | Stop reason: SDUPTHER

## 2019-01-02 NOTE — TELEPHONE ENCOUNTER
Medication refill request for lisinopril and Lorazepam.    Patients Lisinopril has been sent to the pharmacy.    Ok to fill lorazepam?    Updated elise has been printed.

## 2019-01-30 DIAGNOSIS — G40.909 SEIZURE DISORDER (HCC): ICD-10-CM

## 2019-01-30 RX ORDER — LORAZEPAM 2 MG/1
2 TABLET ORAL 2 TIMES DAILY PRN
Qty: 60 TABLET | Refills: 0 | Status: CANCELLED | OUTPATIENT
Start: 2019-01-30

## 2019-01-31 DIAGNOSIS — G40.909 SEIZURE DISORDER (HCC): ICD-10-CM

## 2019-01-31 RX ORDER — LORAZEPAM 2 MG/1
2 TABLET ORAL 2 TIMES DAILY PRN
Qty: 60 TABLET | Refills: 0 | Status: SHIPPED | OUTPATIENT
Start: 2019-01-31 | End: 2019-02-28 | Stop reason: SDUPTHER

## 2019-02-28 ENCOUNTER — PATIENT MESSAGE (OUTPATIENT)
Dept: FAMILY MEDICINE CLINIC | Facility: CLINIC | Age: 59
End: 2019-02-28

## 2019-02-28 DIAGNOSIS — G40.909 SEIZURE DISORDER (HCC): ICD-10-CM

## 2019-02-28 RX ORDER — LORAZEPAM 2 MG/1
2 TABLET ORAL 2 TIMES DAILY PRN
Qty: 60 TABLET | Refills: 0 | Status: SHIPPED | OUTPATIENT
Start: 2019-02-28 | End: 2019-03-27 | Stop reason: SDUPTHER

## 2019-03-04 ENCOUNTER — OFFICE VISIT (OUTPATIENT)
Dept: FAMILY MEDICINE CLINIC | Facility: CLINIC | Age: 59
End: 2019-03-04

## 2019-03-04 VITALS
HEART RATE: 89 BPM | OXYGEN SATURATION: 98 % | BODY MASS INDEX: 23.14 KG/M2 | WEIGHT: 144 LBS | DIASTOLIC BLOOD PRESSURE: 70 MMHG | HEIGHT: 66 IN | TEMPERATURE: 98.2 F | SYSTOLIC BLOOD PRESSURE: 140 MMHG

## 2019-03-04 DIAGNOSIS — E78.2 MIXED HYPERLIPIDEMIA: ICD-10-CM

## 2019-03-04 DIAGNOSIS — G40.109 AUTONOMIC EPILEPTIC SEIZURES (HCC): ICD-10-CM

## 2019-03-04 DIAGNOSIS — K21.9 GASTROESOPHAGEAL REFLUX DISEASE WITHOUT ESOPHAGITIS: ICD-10-CM

## 2019-03-04 DIAGNOSIS — D51.8 MACROCYTIC ANEMIA WITH VITAMIN B12 DEFICIENCY: ICD-10-CM

## 2019-03-04 DIAGNOSIS — I10 ESSENTIAL HYPERTENSION: ICD-10-CM

## 2019-03-04 DIAGNOSIS — G89.4 CHRONIC PAIN SYNDROME: ICD-10-CM

## 2019-03-04 LAB
ALBUMIN SERPL-MCNC: 4.1 G/DL (ref 3.5–5)
ALBUMIN/GLOB SERPL: 1.4 G/DL (ref 1–2)
ALP SERPL-CCNC: 91 U/L (ref 38–126)
ALT SERPL-CCNC: 81 U/L (ref 13–69)
AST SERPL-CCNC: 67 U/L (ref 15–46)
BILIRUB SERPL-MCNC: 0.2 MG/DL (ref 0.2–1.3)
BUN SERPL-MCNC: 11 MG/DL (ref 7–20)
BUN/CREAT SERPL: 12.2 (ref 6.3–21.9)
CALCIUM SERPL-MCNC: 9.3 MG/DL (ref 8.4–10.2)
CHLORIDE SERPL-SCNC: 101 MMOL/L (ref 98–107)
CO2 SERPL-SCNC: 28 MMOL/L (ref 26–30)
CREAT SERPL-MCNC: 0.9 MG/DL (ref 0.6–1.3)
ERYTHROCYTE [DISTWIDTH] IN BLOOD BY AUTOMATED COUNT: 12.2 % (ref 11.5–14.5)
GLOBULIN SER CALC-MCNC: 3 GM/DL
GLUCOSE SERPL-MCNC: 93 MG/DL (ref 74–98)
HCT VFR BLD AUTO: 40.2 % (ref 42–52)
HGB BLD-MCNC: 13.4 G/DL (ref 14–18)
MCH RBC QN AUTO: 31.7 PG (ref 27–31)
MCHC RBC AUTO-ENTMCNC: 33.3 G/DL (ref 30–37)
MCV RBC AUTO: 95 FL (ref 80–94)
PLATELET # BLD AUTO: 274 10*3/MM3 (ref 130–400)
POTASSIUM SERPL-SCNC: 4 MMOL/L (ref 3.5–5.1)
PROT SERPL-MCNC: 7.1 G/DL (ref 6.3–8.2)
RBC # BLD AUTO: 4.23 10*6/MM3 (ref 4.7–6.1)
SODIUM SERPL-SCNC: 137 MMOL/L (ref 137–145)
WBC # BLD AUTO: 6.76 10*3/MM3 (ref 4.8–10.8)

## 2019-03-04 PROCEDURE — 99214 OFFICE O/P EST MOD 30 MIN: CPT | Performed by: NURSE PRACTITIONER

## 2019-03-04 NOTE — PROGRESS NOTES
Subjective   Jonas Terrazas is a 58 y.o. male.     Patient comes to clinic today for 3 month follow up regarding HTN, Seizures, HLD and GERD     HLD- Currently controlled with diet, patient states he is making better food choices and reducing fat in his diet.     HTN- Currently controlled with Lisinopril, not having any adverse effects from the medication.      Seizures- Currently takes Ativan to control seizures and has not had once in over 2 years, denies any adverse effects from medication.      GERD- Currently controlled with Prilosec and Pepcid, denies any adverse effects from medications.     Chronic pain syndrome/chronic lumbago with neuropathy: He is managed by Dr. Guthrie in [place], [place]. He will return for his next appointment in April.     Macrocytic Anemia- Patient has started taking an OTC B12 supplement and a daily multivitamin.                  The following portions of the patient's history were reviewed and updated as appropriate: allergies, current medications, past family history, past medical history, past social history, past surgical history and problem list.    Review of Systems   Constitutional: Negative.    HENT: Negative.    Eyes: Negative.    Respiratory: Negative.    Cardiovascular: Negative for palpitations.   Gastrointestinal: Negative.    Endocrine: Negative.    Genitourinary: Negative.    Musculoskeletal: Negative.    Skin: Negative.    Allergic/Immunologic: Negative.    Neurological: Negative for dizziness, syncope, weakness and numbness.   Hematological: Negative for adenopathy.   Psychiatric/Behavioral: Negative for confusion and suicidal ideas. The patient is not nervous/anxious.      Vitals:    03/04/19 1430   BP: 140/70   Pulse: 89   Temp: 98.2 °F (36.8 °C)   SpO2: 98%       Objective   Physical Exam   Constitutional: He is oriented to person, place, and time. He appears well-developed and well-nourished. No distress.   HENT:   Head: Normocephalic.   Right Ear: External ear  normal.   Left Ear: External ear normal.   Nose: Nose normal.   Mouth/Throat: Oropharynx is clear and moist. No oropharyngeal exudate.   Eyes: Conjunctivae are normal. Pupils are equal, round, and reactive to light.   Neck: Normal range of motion. Neck supple. No tracheal deviation present. No thyromegaly present.   Cardiovascular: Normal rate, regular rhythm, normal heart sounds and intact distal pulses.   No murmur heard.  Pulmonary/Chest: Effort normal and breath sounds normal. No respiratory distress. He has no wheezes. He has no rales. He exhibits no tenderness.   Abdominal: Soft. Bowel sounds are normal. He exhibits no distension. There is no hepatosplenomegaly or splenomegaly. There is no tenderness. There is no guarding.   Musculoskeletal: Normal range of motion. He exhibits no edema or tenderness.   NROM for all major joints   Neurological: He is alert and oriented to person, place, and time. Coordination and gait normal.   Skin: Skin is warm and dry. No rash noted.   Psychiatric: He has a normal mood and affect. His behavior is normal. Judgment and thought content normal.   Nursing note and vitals reviewed.      Assessment/Plan   Jonas was seen today for follow-up.    Diagnoses and all orders for this visit:    Mixed hyperlipidemia  -     CBC (No Diff)  -     Comprehensive Metabolic Panel    Essential hypertension  -     CBC (No Diff)  -     Comprehensive Metabolic Panel    Autonomic epileptic seizures (CMS/HCC)    Gastroesophageal reflux disease without esophagitis    Chronic pain syndrome    Macrocytic anemia with vitamin B12 deficiency  -     CBC (No Diff)  -     Comprehensive Metabolic Panel       Patient is here today, for follow up on his chronic conditions:    Advised patient to continue taking all his medications as previously directed. Patient denies needing refills.     Patient declines flu vaccine and shingles vaccine today.     Will obtain CBC and CMP for medical management of his chronic  conditions: HLD, HTN, anemia and seizures. All conditions are well controlled on current medications. I will contact patient regarding test results and provide instructions regarding any necessary changes in plan of care.     GERD controlled on current medications.     Patient encouraged to keep his follow up appointments with Dr. Guthrie, for pain management.     Patient was encouraged to keep me informed of any acute changes, lack of improvement, or any new concerning symptoms.      Patient to RTC in 3 months for follow up and PRN    Patient voiced understanding of all instructions and denied further questions.

## 2019-03-05 DIAGNOSIS — R74.8 ELEVATED LIVER ENZYMES: Primary | ICD-10-CM

## 2019-03-10 ENCOUNTER — RESULTS ENCOUNTER (OUTPATIENT)
Dept: FAMILY MEDICINE CLINIC | Facility: CLINIC | Age: 59
End: 2019-03-10

## 2019-03-10 DIAGNOSIS — R74.8 ELEVATED LIVER ENZYMES: ICD-10-CM

## 2019-03-27 DIAGNOSIS — I10 ESSENTIAL HYPERTENSION: ICD-10-CM

## 2019-03-27 DIAGNOSIS — G40.909 SEIZURE DISORDER (HCC): ICD-10-CM

## 2019-03-27 RX ORDER — LORAZEPAM 2 MG/1
2 TABLET ORAL 2 TIMES DAILY PRN
Qty: 60 TABLET | Refills: 0 | Status: SHIPPED | OUTPATIENT
Start: 2019-03-27 | End: 2019-04-01 | Stop reason: SDUPTHER

## 2019-03-27 RX ORDER — LISINOPRIL 20 MG/1
20 TABLET ORAL DAILY
Qty: 90 TABLET | Refills: 0 | Status: SHIPPED | OUTPATIENT
Start: 2019-03-27 | End: 2019-06-25 | Stop reason: SDUPTHER

## 2019-03-27 NOTE — TELEPHONE ENCOUNTER
Lisinopril has been sent to the pharmacy.    Ok to fill lorazepam?    Updated elise is in the chart.

## 2019-04-01 ENCOUNTER — TELEPHONE (OUTPATIENT)
Dept: FAMILY MEDICINE CLINIC | Facility: CLINIC | Age: 59
End: 2019-04-01

## 2019-04-01 DIAGNOSIS — G40.909 SEIZURE DISORDER (HCC): ICD-10-CM

## 2019-04-01 RX ORDER — LORAZEPAM 2 MG/1
2 TABLET ORAL 2 TIMES DAILY PRN
Qty: 60 TABLET | Refills: 0 | Status: CANCELLED | OUTPATIENT
Start: 2019-04-01

## 2019-04-01 RX ORDER — LORAZEPAM 2 MG/1
2 TABLET ORAL 2 TIMES DAILY PRN
Qty: 60 TABLET | Refills: 0 | OUTPATIENT
Start: 2019-04-01

## 2019-04-01 RX ORDER — LORAZEPAM 2 MG/1
2 TABLET ORAL 2 TIMES DAILY PRN
Qty: 60 TABLET | Refills: 0 | Status: SHIPPED | OUTPATIENT
Start: 2019-04-01 | End: 2019-04-30 | Stop reason: SDUPTHER

## 2019-04-01 NOTE — TELEPHONE ENCOUNTER
I just sent it in to [facility] on 3/27. If he has picked it up, he can not get one at [facility] until end of month. IF he did not get the one at [facility], then it can be cancelled and sent to [facility]

## 2019-04-01 NOTE — TELEPHONE ENCOUNTER
Pt came by office req the lorazepam canceled at [facility] and resent to [facility].

## 2019-04-01 NOTE — TELEPHONE ENCOUNTER
Patient states that due to new regulations that he has to fill all of his controlled scripts at the same pharmacy. [facility] informed him that they would not fill the lorazepam script.    The patient will need a new script sent.

## 2019-04-30 DIAGNOSIS — G40.909 SEIZURE DISORDER (HCC): ICD-10-CM

## 2019-04-30 RX ORDER — LORAZEPAM 2 MG/1
2 TABLET ORAL 2 TIMES DAILY PRN
Qty: 60 TABLET | Refills: 0 | Status: SHIPPED | OUTPATIENT
Start: 2019-04-30 | End: 2019-05-28 | Stop reason: SDUPTHER

## 2019-05-28 DIAGNOSIS — G40.909 SEIZURE DISORDER (HCC): ICD-10-CM

## 2019-05-29 RX ORDER — LORAZEPAM 2 MG/1
2 TABLET ORAL 2 TIMES DAILY PRN
Qty: 60 TABLET | Refills: 0 | Status: SHIPPED | OUTPATIENT
Start: 2019-05-29 | End: 2019-06-25 | Stop reason: SDUPTHER

## 2019-06-25 DIAGNOSIS — G40.909 SEIZURE DISORDER (HCC): ICD-10-CM

## 2019-06-25 DIAGNOSIS — I10 ESSENTIAL HYPERTENSION: ICD-10-CM

## 2019-06-25 RX ORDER — LISINOPRIL 20 MG/1
20 TABLET ORAL DAILY
Qty: 90 TABLET | Refills: 0 | Status: SHIPPED | OUTPATIENT
Start: 2019-06-25 | End: 2019-09-12 | Stop reason: SDUPTHER

## 2019-06-25 RX ORDER — LORAZEPAM 2 MG/1
2 TABLET ORAL 2 TIMES DAILY PRN
Qty: 60 TABLET | Refills: 0 | Status: SHIPPED | OUTPATIENT
Start: 2019-06-25 | End: 2019-07-24 | Stop reason: SDUPTHER

## 2019-06-25 NOTE — TELEPHONE ENCOUNTER
Ok to fill?    Updated elise has been printed.    Patient needs UDS and Updated Controlled Substance agreement.

## 2019-07-24 DIAGNOSIS — G40.909 SEIZURE DISORDER (HCC): ICD-10-CM

## 2019-07-24 RX ORDER — LORAZEPAM 2 MG/1
2 TABLET ORAL 2 TIMES DAILY PRN
Qty: 60 TABLET | Refills: 0 | Status: SHIPPED | OUTPATIENT
Start: 2019-07-24 | End: 2019-08-19 | Stop reason: SDUPTHER

## 2019-08-19 DIAGNOSIS — G40.909 SEIZURE DISORDER (HCC): ICD-10-CM

## 2019-08-20 RX ORDER — LORAZEPAM 2 MG/1
2 TABLET ORAL 2 TIMES DAILY PRN
Qty: 60 TABLET | Refills: 0 | Status: SHIPPED | OUTPATIENT
Start: 2019-08-20 | End: 2019-09-17 | Stop reason: SDUPTHER

## 2019-09-12 ENCOUNTER — OFFICE VISIT (OUTPATIENT)
Dept: FAMILY MEDICINE CLINIC | Facility: CLINIC | Age: 59
End: 2019-09-12

## 2019-09-12 VITALS
OXYGEN SATURATION: 97 % | BODY MASS INDEX: 24.15 KG/M2 | TEMPERATURE: 99.1 F | HEIGHT: 66 IN | HEART RATE: 87 BPM | DIASTOLIC BLOOD PRESSURE: 80 MMHG | SYSTOLIC BLOOD PRESSURE: 130 MMHG | WEIGHT: 150.25 LBS

## 2019-09-12 DIAGNOSIS — E78.2 MIXED HYPERLIPIDEMIA: ICD-10-CM

## 2019-09-12 DIAGNOSIS — G40.109 AUTONOMIC EPILEPTIC SEIZURES (HCC): ICD-10-CM

## 2019-09-12 DIAGNOSIS — G89.4 CHRONIC PAIN SYNDROME: ICD-10-CM

## 2019-09-12 DIAGNOSIS — I10 ESSENTIAL HYPERTENSION: ICD-10-CM

## 2019-09-12 DIAGNOSIS — Z87.19 HISTORY OF DIVERTICULITIS: ICD-10-CM

## 2019-09-12 DIAGNOSIS — K21.9 GASTROESOPHAGEAL REFLUX DISEASE WITHOUT ESOPHAGITIS: ICD-10-CM

## 2019-09-12 DIAGNOSIS — D51.8 MACROCYTIC ANEMIA WITH VITAMIN B12 DEFICIENCY: ICD-10-CM

## 2019-09-12 PROCEDURE — 99214 OFFICE O/P EST MOD 30 MIN: CPT | Performed by: NURSE PRACTITIONER

## 2019-09-12 RX ORDER — LISINOPRIL 20 MG/1
20 TABLET ORAL DAILY
Qty: 90 TABLET | Refills: 0 | Status: SHIPPED | OUTPATIENT
Start: 2019-09-12 | End: 2019-12-10 | Stop reason: SDUPTHER

## 2019-09-12 RX ORDER — DIPHENOXYLATE HYDROCHLORIDE AND ATROPINE SULFATE 2.5; .025 MG/1; MG/1
1 TABLET ORAL 4 TIMES DAILY PRN
Qty: 30 TABLET | Refills: 0 | Status: SHIPPED | OUTPATIENT
Start: 2019-09-12 | End: 2021-08-30 | Stop reason: SDUPTHER

## 2019-09-12 NOTE — PROGRESS NOTES
Subjective   Jonas Terrazas is a 58 y.o. male.     Patient comes to clinic today for 3 month follow up regarding HTN, Seizures, HLD and GERD     HLD- Currently controlled with diet, patient states he is making better food choices and reducing fat in his diet.     HTN- Currently controlled with Lisinopril, not having any adverse effects from the medication.      Seizures- Currently takes Ativan to control seizures and has not had once in over 2 years, denies any adverse effects from medication.      GERD- Currently controlled with Prilosec and Pepcid, denies any adverse effects from medications.     Chronic pain syndrome/chronic lumbago with neuropathy: He is managed by Dr. Guthrie in [place], [place]. He will return for his next appointment in     Macrocytic Anemia- Patient is taking an OTC B12 supplement and a daily multivitamin.     History diverticulitis             The following portions of the patient's history were reviewed and updated as appropriate: allergies, current medications, past family history, past medical history, past social history, past surgical history and problem list.    Review of Systems   Constitutional: Negative.    HENT: Negative.    Eyes: Negative.    Respiratory: Negative.    Cardiovascular: Negative for palpitations.   Gastrointestinal: Negative.    Endocrine: Negative.    Genitourinary: Negative.    Musculoskeletal: Negative.    Skin: Negative.    Allergic/Immunologic: Negative.    Neurological: Negative for dizziness, syncope, weakness and numbness.   Hematological: Negative for adenopathy.   Psychiatric/Behavioral: Negative for confusion and suicidal ideas. The patient is not nervous/anxious.      Vitals:    09/12/19 1736   BP: 130/80   Pulse: 87   Temp: 99.1 °F (37.3 °C)   SpO2: 97%       Objective   Physical Exam   Constitutional: He is oriented to person, place, and time. He appears well-developed and well-nourished. No distress.   HENT:   Head: Normocephalic.   Right Ear:  External ear normal.   Left Ear: External ear normal.   Nose: Nose normal.   Mouth/Throat: Oropharynx is clear and moist. No oropharyngeal exudate.   Eyes: Conjunctivae are normal. Pupils are equal, round, and reactive to light.   Neck: Normal range of motion. Neck supple. No tracheal deviation present. No thyromegaly present.   Cardiovascular: Normal rate, regular rhythm, normal heart sounds and intact distal pulses.   No murmur heard.  Pulmonary/Chest: Effort normal and breath sounds normal. No respiratory distress. He has no wheezes. He has no rales. He exhibits no tenderness.   Abdominal: Soft. Bowel sounds are normal. He exhibits no distension. There is no hepatosplenomegaly or splenomegaly. There is no tenderness. There is no guarding.   Musculoskeletal: Normal range of motion. He exhibits no edema or tenderness.   NROM for all major joints   Neurological: He is alert and oriented to person, place, and time. Coordination and gait normal.   Skin: Skin is warm and dry. No rash noted.   Psychiatric: He has a normal mood and affect. His behavior is normal. Judgment and thought content normal.   Nursing note and vitals reviewed.      Assessment/Plan   Jonas was seen today for follow-up.    Diagnoses and all orders for this visit:    Essential hypertension  -     lisinopril (PRINIVIL,ZESTRIL) 20 MG tablet; Take 1 tablet by mouth Daily.  -     CBC (No Diff); Future  -     Comprehensive Metabolic Panel; Future    Mixed hyperlipidemia  -     CBC (No Diff); Future  -     Comprehensive Metabolic Panel; Future  -     Lipid Panel; Future    Gastroesophageal reflux disease without esophagitis    Macrocytic anemia with vitamin B12 deficiency  -     Vitamin B12; Future    Chronic pain syndrome    Autonomic epileptic seizures (CMS/HCC)    History of diverticulitis  -     diphenoxylate-atropine (LOMOTIL) 2.5-0.025 MG per tablet; Take 1 tablet by mouth 4 (Four) Times a Day As Needed for Diarrhea for up to 30 days.       Patient  is here today, for follow up on his chronic conditions:    Advised patient to continue taking all his medications as previously directed. Patient denies needing refills.     Patient declines flu vaccine vaccine today.     Will obtain CBC, CMP, FLP and B 12 for medical management of his chronic conditions: HLD, HTN, anemia and seizures. All conditions are well controlled on current medications. I will contact patient regarding test results and provide instructions regarding any necessary changes in plan of care.     Continue Valium for seizure disorder. History and physical exam exhibit continued safe and appropriate use of controlled substance. ANNA reviewed and in chart. Will History and physical exam exhibit continued safe and appropriate use of controlled substance. CSA updated today. He will bring in drug scrren from pain clinic, when her reterunrs for fasting labs.     GERD controlled on current medications.     Patient encouraged to keep his follow up appointments with Dr. Guthrie, for pain management.     Patient was encouraged to keep me informed of any acute changes, lack of improvement, or any new concerning symptoms.      Patient to RTC in 3 months for follow up and PRN    Patient voiced understanding of all instructions and denied further questions.

## 2019-09-17 ENCOUNTER — RESULTS ENCOUNTER (OUTPATIENT)
Dept: FAMILY MEDICINE CLINIC | Facility: CLINIC | Age: 59
End: 2019-09-17

## 2019-09-17 DIAGNOSIS — D51.8 MACROCYTIC ANEMIA WITH VITAMIN B12 DEFICIENCY: ICD-10-CM

## 2019-09-17 DIAGNOSIS — E78.2 MIXED HYPERLIPIDEMIA: ICD-10-CM

## 2019-09-17 DIAGNOSIS — I10 ESSENTIAL HYPERTENSION: ICD-10-CM

## 2019-09-17 DIAGNOSIS — G40.909 SEIZURE DISORDER (HCC): ICD-10-CM

## 2019-09-18 RX ORDER — LORAZEPAM 2 MG/1
2 TABLET ORAL 2 TIMES DAILY PRN
Qty: 60 TABLET | Refills: 0 | Status: SHIPPED | OUTPATIENT
Start: 2019-09-20 | End: 2019-10-15 | Stop reason: SDUPTHER

## 2019-10-02 LAB
ALBUMIN SERPL-MCNC: 4.7 G/DL (ref 3.5–5.2)
ALBUMIN/GLOB SERPL: 1.7 G/DL
ALP SERPL-CCNC: 75 U/L (ref 39–117)
ALT SERPL-CCNC: 22 U/L (ref 1–41)
AST SERPL-CCNC: 18 U/L (ref 1–40)
BILIRUB SERPL-MCNC: 0.5 MG/DL (ref 0.2–1.2)
BUN SERPL-MCNC: 11 MG/DL (ref 6–20)
BUN/CREAT SERPL: 9.9 (ref 7–25)
CALCIUM SERPL-MCNC: 9.6 MG/DL (ref 8.6–10.5)
CHLORIDE SERPL-SCNC: 101 MMOL/L (ref 98–107)
CHOLEST SERPL-MCNC: 221 MG/DL (ref 0–200)
CO2 SERPL-SCNC: 26.6 MMOL/L (ref 22–29)
CREAT SERPL-MCNC: 1.11 MG/DL (ref 0.76–1.27)
ERYTHROCYTE [DISTWIDTH] IN BLOOD BY AUTOMATED COUNT: 12.8 % (ref 12.3–15.4)
GLOBULIN SER CALC-MCNC: 2.8 GM/DL
GLUCOSE SERPL-MCNC: 98 MG/DL (ref 65–99)
HCT VFR BLD AUTO: 43.2 % (ref 37.5–51)
HDLC SERPL-MCNC: 39 MG/DL (ref 40–60)
HGB BLD-MCNC: 14.4 G/DL (ref 13–17.7)
LDLC SERPL CALC-MCNC: 154 MG/DL (ref 0–100)
MCH RBC QN AUTO: 31.7 PG (ref 26.6–33)
MCHC RBC AUTO-ENTMCNC: 33.3 G/DL (ref 31.5–35.7)
MCV RBC AUTO: 95.2 FL (ref 79–97)
PLATELET # BLD AUTO: 325 10*3/MM3 (ref 140–450)
POTASSIUM SERPL-SCNC: 4.1 MMOL/L (ref 3.5–5.2)
PROT SERPL-MCNC: 7.5 G/DL (ref 6–8.5)
RBC # BLD AUTO: 4.54 10*6/MM3 (ref 4.14–5.8)
SODIUM SERPL-SCNC: 141 MMOL/L (ref 136–145)
TRIGL SERPL-MCNC: 138 MG/DL (ref 0–150)
VIT B12 SERPL-MCNC: 510 PG/ML (ref 211–946)
VLDLC SERPL CALC-MCNC: 27.6 MG/DL
WBC # BLD AUTO: 6.87 10*3/MM3 (ref 3.4–10.8)

## 2019-10-15 DIAGNOSIS — G40.909 SEIZURE DISORDER (HCC): ICD-10-CM

## 2019-10-15 RX ORDER — LORAZEPAM 2 MG/1
2 TABLET ORAL 2 TIMES DAILY PRN
Qty: 60 TABLET | Refills: 0 | Status: SHIPPED | OUTPATIENT
Start: 2019-10-15 | End: 2019-11-12 | Stop reason: SDUPTHER

## 2019-11-12 DIAGNOSIS — G40.909 SEIZURE DISORDER (HCC): ICD-10-CM

## 2019-11-12 RX ORDER — LORAZEPAM 2 MG/1
2 TABLET ORAL 2 TIMES DAILY PRN
Qty: 60 TABLET | Refills: 0 | Status: SHIPPED | OUTPATIENT
Start: 2019-11-12 | End: 2019-12-10 | Stop reason: SDUPTHER

## 2019-12-03 ENCOUNTER — FLU SHOT (OUTPATIENT)
Dept: FAMILY MEDICINE CLINIC | Facility: CLINIC | Age: 59
End: 2019-12-03

## 2019-12-03 DIAGNOSIS — Z23 NEED FOR VACCINATION: Primary | ICD-10-CM

## 2019-12-03 PROCEDURE — G0008 ADMIN INFLUENZA VIRUS VAC: HCPCS | Performed by: NURSE PRACTITIONER

## 2019-12-03 PROCEDURE — 90674 CCIIV4 VAC NO PRSV 0.5 ML IM: CPT | Performed by: NURSE PRACTITIONER

## 2019-12-05 DIAGNOSIS — K57.92 DIVERTICULITIS: Primary | ICD-10-CM

## 2019-12-05 RX ORDER — METRONIDAZOLE 500 MG/1
500 TABLET ORAL 3 TIMES DAILY
Qty: 30 TABLET | Refills: 0 | Status: SHIPPED | OUTPATIENT
Start: 2019-12-05 | End: 2020-06-29 | Stop reason: SDUPTHER

## 2019-12-05 RX ORDER — LEVOFLOXACIN 750 MG/1
750 TABLET ORAL DAILY
Qty: 10 TABLET | Refills: 0 | Status: SHIPPED | OUTPATIENT
Start: 2019-12-05 | End: 2019-12-15

## 2019-12-10 DIAGNOSIS — G40.909 SEIZURE DISORDER (HCC): ICD-10-CM

## 2019-12-10 DIAGNOSIS — I10 ESSENTIAL HYPERTENSION: ICD-10-CM

## 2019-12-11 RX ORDER — LISINOPRIL 20 MG/1
20 TABLET ORAL DAILY
Qty: 90 TABLET | Refills: 0 | Status: SHIPPED | OUTPATIENT
Start: 2019-12-11 | End: 2020-03-03

## 2019-12-11 RX ORDER — LORAZEPAM 2 MG/1
2 TABLET ORAL 2 TIMES DAILY PRN
Qty: 60 TABLET | Refills: 0 | Status: SHIPPED | OUTPATIENT
Start: 2019-12-11 | End: 2020-01-08 | Stop reason: SDUPTHER

## 2020-01-08 DIAGNOSIS — G40.909 SEIZURE DISORDER (HCC): ICD-10-CM

## 2020-01-08 RX ORDER — LORAZEPAM 2 MG/1
2 TABLET ORAL 2 TIMES DAILY PRN
Qty: 60 TABLET | Refills: 0 | Status: SHIPPED | OUTPATIENT
Start: 2020-01-08 | End: 2020-02-06 | Stop reason: SDUPTHER

## 2020-02-06 DIAGNOSIS — G40.909 SEIZURE DISORDER (HCC): ICD-10-CM

## 2020-02-07 RX ORDER — LORAZEPAM 2 MG/1
2 TABLET ORAL 2 TIMES DAILY PRN
Qty: 60 TABLET | Refills: 0 | Status: SHIPPED | OUTPATIENT
Start: 2020-02-07 | End: 2020-03-05 | Stop reason: SDUPTHER

## 2020-03-03 DIAGNOSIS — I10 ESSENTIAL HYPERTENSION: ICD-10-CM

## 2020-03-03 RX ORDER — LISINOPRIL 20 MG/1
TABLET ORAL
Qty: 90 TABLET | Refills: 0 | Status: SHIPPED | OUTPATIENT
Start: 2020-03-03 | End: 2020-05-28 | Stop reason: SDUPTHER

## 2020-03-05 DIAGNOSIS — G40.909 SEIZURE DISORDER (HCC): ICD-10-CM

## 2020-03-05 RX ORDER — LORAZEPAM 2 MG/1
2 TABLET ORAL 2 TIMES DAILY PRN
Qty: 60 TABLET | Refills: 0 | Status: SHIPPED | OUTPATIENT
Start: 2020-03-05 | End: 2020-04-01 | Stop reason: SDUPTHER

## 2020-04-01 DIAGNOSIS — G40.909 SEIZURE DISORDER (HCC): ICD-10-CM

## 2020-04-02 RX ORDER — LORAZEPAM 2 MG/1
2 TABLET ORAL 2 TIMES DAILY PRN
Qty: 60 TABLET | Refills: 0 | Status: SHIPPED | OUTPATIENT
Start: 2020-04-02 | End: 2020-04-30 | Stop reason: SDUPTHER

## 2020-04-30 ENCOUNTER — OFFICE VISIT (OUTPATIENT)
Dept: FAMILY MEDICINE CLINIC | Facility: CLINIC | Age: 60
End: 2020-04-30

## 2020-04-30 VITALS
TEMPERATURE: 97.9 F | WEIGHT: 146.38 LBS | SYSTOLIC BLOOD PRESSURE: 130 MMHG | BODY MASS INDEX: 23.53 KG/M2 | DIASTOLIC BLOOD PRESSURE: 90 MMHG | HEART RATE: 95 BPM | HEIGHT: 66 IN | OXYGEN SATURATION: 98 %

## 2020-04-30 DIAGNOSIS — S39.012A ACUTE MYOFASCIAL STRAIN OF LUMBAR REGION, INITIAL ENCOUNTER: Primary | ICD-10-CM

## 2020-04-30 DIAGNOSIS — G40.909 SEIZURE DISORDER (HCC): ICD-10-CM

## 2020-04-30 DIAGNOSIS — M54.42 ACUTE MIDLINE LOW BACK PAIN WITH LEFT-SIDED SCIATICA: ICD-10-CM

## 2020-04-30 PROCEDURE — 99214 OFFICE O/P EST MOD 30 MIN: CPT | Performed by: FAMILY MEDICINE

## 2020-04-30 PROCEDURE — 96372 THER/PROPH/DIAG INJ SC/IM: CPT | Performed by: FAMILY MEDICINE

## 2020-04-30 RX ORDER — KETOROLAC TROMETHAMINE 30 MG/ML
60 INJECTION, SOLUTION INTRAMUSCULAR; INTRAVENOUS ONCE
Status: COMPLETED | OUTPATIENT
Start: 2020-04-30 | End: 2020-04-30

## 2020-04-30 RX ORDER — LORAZEPAM 2 MG/1
2 TABLET ORAL 2 TIMES DAILY PRN
Qty: 60 TABLET | Refills: 0 | Status: SHIPPED | OUTPATIENT
Start: 2020-04-30 | End: 2020-05-28 | Stop reason: SDUPTHER

## 2020-04-30 RX ORDER — METHYLPREDNISOLONE ACETATE 80 MG/ML
80 INJECTION, SUSPENSION INTRA-ARTICULAR; INTRALESIONAL; INTRAMUSCULAR; SOFT TISSUE ONCE
Status: COMPLETED | OUTPATIENT
Start: 2020-04-30 | End: 2020-04-30

## 2020-04-30 RX ORDER — METHYLPREDNISOLONE 4 MG/1
TABLET ORAL
Qty: 21 TABLET | Refills: 0 | Status: SHIPPED | OUTPATIENT
Start: 2020-04-30 | End: 2020-10-22

## 2020-04-30 RX ORDER — NAPROXEN 500 MG/1
500 TABLET ORAL 2 TIMES DAILY WITH MEALS
Qty: 20 TABLET | Refills: 0 | Status: SHIPPED | OUTPATIENT
Start: 2020-04-30 | End: 2020-05-10

## 2020-04-30 RX ADMIN — METHYLPREDNISOLONE ACETATE 80 MG: 80 INJECTION, SUSPENSION INTRA-ARTICULAR; INTRALESIONAL; INTRAMUSCULAR; SOFT TISSUE at 09:58

## 2020-04-30 RX ADMIN — KETOROLAC TROMETHAMINE 60 MG: 30 INJECTION, SOLUTION INTRAMUSCULAR; INTRAVENOUS at 09:58

## 2020-04-30 NOTE — PROGRESS NOTES
"Subjective   Jonas Terrazas is a 59 y.o. male.     History of Present Illness   Mr. Holman presents today with complaint of low back pain.  He has underlying history of chronic pain due to traumatic thoracic compression fractures, spinal cord injury.  Has implanted intrathecal pump since 1999 managed per physician in [place].    Approximately 10 days ago he suffered acute left lower back pain while picking up approximate 30 pound bag of maria eugenia litter.  States he felt/heard a \"pop\", felt immediate pain in his lower mid back.  He is able to keep walking.  Even attempted to finish mowing the lawn.  Did well until he attempted to mow downhill on uneven ground.  Bouncing motion caused \"knife stabbing\" pain in his mid lower back, left lower back.  Within the next 1 4 to 48 hours he had increasing soreness, stiffness.  He treated with his usual breakthrough pain medications including Tylenol 3 and tramadol.  He used his topical lidocaine.  Over the last 10 days he has had an approximate 50% improvement.  He now complains of moderate aching pain mid lower back, left lower back radiating into left gluteal area, left hip.  Some better with standing, worse with prolonged sitting.  No change in bowel or bladder habits.  He has chronic right lower extremity weakness associated with his previous injury which is stable.  He has no sore/stiff upon first awakening in the morning.  Some better after walking.    The following portions of the patient's history were reviewed and updated as appropriate: allergies, current medications, past family history, past medical history, past social history, past surgical history and problem list.    Review of Systems   Constitutional: Negative for fatigue and fever.   Respiratory: Negative for cough and shortness of breath.    Cardiovascular: Negative for chest pain, palpitations and leg swelling.   Gastrointestinal: Negative for nausea and vomiting.   Genitourinary: Negative for difficulty " urinating.   Musculoskeletal: Positive for back pain and gait problem.        As per hpi   Skin: Negative for rash and wound.   Neurological: Positive for weakness. Negative for numbness.   Hematological: Does not bruise/bleed easily.   Psychiatric/Behavioral: Positive for sleep disturbance. Negative for confusion.       Objective    Vitals:    04/30/20 0829   BP: 130/90   Pulse: 95   Temp: 97.9 °F (36.6 °C)   SpO2: 98%     Body mass index is 23.64 kg/m².      04/30/20 0829   Weight: 66.4 kg (146 lb 6 oz)         Physical Exam   Constitutional: He is oriented to person, place, and time. He appears well-developed and well-nourished. He is cooperative. He does not appear ill. No distress.   HENT:   Head: Normocephalic and atraumatic.   Mouth/Throat: Mucous membranes are normal. Mucous membranes are not dry.   Musculoskeletal:        Lumbar back: He exhibits decreased range of motion (good forward flexion, limited extension), tenderness (soft tissue in area demarcated), bony tenderness (mildly left SI joint), swelling (mild just to left of midline at site of catheter insertion, pt stables this is stable from previous) and spasm (left lumbar area).        Back:      Vascular Status -  His right foot exhibits no edema. His left foot exhibits no edema.  Neurological: He is alert and oriented to person, place, and time. He has normal reflexes. No sensory deficit. Gait (mildly antalgic, using single prong cane) abnormal.   Neg SLR bilaterally. 5/5 strength on LLE, 3/5 RLE   Skin: Skin is warm and dry. No bruising and no rash noted.   Psychiatric: His speech is normal and behavior is normal. Thought content normal. His mood appears anxious (mildly). Cognition and memory are normal.   Nursing note and vitals reviewed.      Assessment/Plan   Jonas was seen today for back pain.    Diagnoses and all orders for this visit:    Acute myofascial strain of lumbar region, initial encounter  -     ketorolac (TORADOL) injection 60 mg  -      methylPREDNISolone acetate (DEPO-medrol) injection 80 mg    Acute midline low back pain with left-sided sciatica  -     ketorolac (TORADOL) injection 60 mg  -     methylPREDNISolone acetate (DEPO-medrol) injection 80 mg  -     XR Spine Lumbar 2 or 3 View; Future    Seizure disorder (CMS/HCC)  -     LORazepam (ATIVAN) 2 MG tablet; Take 1 tablet by mouth 2 (Two) Times a Day As Needed for Anxiety.    Other orders  -     naproxen (Naprosyn) 500 MG tablet; Take 1 tablet by mouth 2 (Two) Times a Day With Meals for 10 days.  -     methylPREDNISolone (MEDROL, THEODORA,) 4 MG tablet; Take as directed on package instructions.       No new focal neurological deficits on exam. Condition slowly improving. He is concerned about effects on catheter and wishes to have xray, order as above.    I have reviewed risks/benefits and potential side effects of various treatment options. Pt voices understanding and wishes to proceed with point of care treatments as above. NSAID and oral corticosteroid with GI precautions reviewed. Relative rest, ice/heat cycles, gentle ROM exercise reviewed.     He is also encouraged to touch base with his pain mgnt physician if he has minimal improvement over next 7-10 days.    Routine refill of ativan provided as due this weekend.    I will contact patient regarding test results and provide instructions regarding any necessary changes in plan of care.  Patient was encouraged to keep me informed of any acute changes, lack of improvement, or any new concerning symptoms.  Pt is aware of reasons to seek emergent care.  Patient voiced understanding of all instructions and denied further questions.  As part of patient's treatment plan I am prescribing a controlled substance.  The patient has been made aware of the appropriate use of such medications, including potential risk of somnolence, limited ability to drive and/or work safely, and potential for dependence and/or overdose.  It has also been made clear that  these medications are for use by this patient only, without concomitant use of alcohol or other substances, unless prescribed.  ANNA report reviewed and scanned into chart.  Last ANNA date 1/6/2020. Update requested.          Please note that portions of this note may have been completed with a voice recognition program. Efforts were made to edit the dictations, but occasionally words are mistranscribed.

## 2020-05-28 DIAGNOSIS — I10 ESSENTIAL HYPERTENSION: ICD-10-CM

## 2020-05-28 DIAGNOSIS — G40.909 SEIZURE DISORDER (HCC): ICD-10-CM

## 2020-05-28 RX ORDER — LORAZEPAM 2 MG/1
2 TABLET ORAL 2 TIMES DAILY PRN
Qty: 60 TABLET | Refills: 0 | Status: SHIPPED | OUTPATIENT
Start: 2020-05-28 | End: 2020-06-24 | Stop reason: SDUPTHER

## 2020-05-28 RX ORDER — LISINOPRIL 20 MG/1
20 TABLET ORAL DAILY
Qty: 90 TABLET | Refills: 0 | Status: SHIPPED | OUTPATIENT
Start: 2020-05-28 | End: 2020-08-20 | Stop reason: SDUPTHER

## 2020-06-24 DIAGNOSIS — G40.909 SEIZURE DISORDER (HCC): ICD-10-CM

## 2020-06-25 RX ORDER — LORAZEPAM 2 MG/1
2 TABLET ORAL 2 TIMES DAILY PRN
Qty: 60 TABLET | Refills: 1 | Status: SHIPPED | OUTPATIENT
Start: 2020-06-25 | End: 2020-08-20 | Stop reason: SDUPTHER

## 2020-06-29 DIAGNOSIS — K57.92 DIVERTICULITIS: ICD-10-CM

## 2020-06-29 RX ORDER — LEVOFLOXACIN 750 MG/1
750 TABLET ORAL DAILY
Qty: 10 TABLET | Refills: 0 | Status: CANCELLED | OUTPATIENT
Start: 2020-06-29 | End: 2020-07-09

## 2020-06-29 NOTE — TELEPHONE ENCOUNTER
Caller: Nini Jonas E    Relationship: Self    Best call back number: [phone]  Medication needed:   Requested Prescriptions     Pending Prescriptions Disp Refills   • levoFLOXacin (LEVAQUIN) 750 MG tablet 10 tablet 0     Sig: Take 1 tablet by mouth Daily for 10 days.   • metroNIDAZOLE (FLAGYL) 500 MG tablet 30 tablet 0     Sig: Take 1 tablet by mouth 3 (Three) Times a Day for 10 days.       When do you need the refill by:07/30/20  What details did the patient provide when requesting the medication: WE ATTEMPTED TO SCHEDULE AN OFFICE VISIT BUT PT ANSWERED YES TO SOME OF THE COVID 19 SYMPTOMS WHEN ATTEMPTING TO SCHEDULE A VIDEO VISIT THE 1ST AVAILABLE WASN'T UNTIL 07/08/20 PT STATED THAT WOULD BE TOO FAR OUT    Does the patient have less than a 3 day supply:  [x] Yes  [] No    What is the patient's preferred pharmacy:  [phone]

## 2020-06-30 ENCOUNTER — TELEPHONE (OUTPATIENT)
Dept: FAMILY MEDICINE CLINIC | Facility: CLINIC | Age: 60
End: 2020-06-30

## 2020-06-30 RX ORDER — CIPROFLOXACIN 250 MG/1
250 TABLET, FILM COATED ORAL 2 TIMES DAILY
Qty: 20 TABLET | Refills: 0 | Status: SHIPPED | OUTPATIENT
Start: 2020-06-30 | End: 2020-10-22

## 2020-06-30 RX ORDER — METRONIDAZOLE 500 MG/1
500 TABLET ORAL 2 TIMES DAILY
Qty: 20 TABLET | Refills: 0 | Status: SHIPPED | OUTPATIENT
Start: 2020-06-30 | End: 2020-07-10

## 2020-06-30 NOTE — TELEPHONE ENCOUNTER
Per Dr. Young Cipro 250 BID and Flagyl 500 MG BID has been sent to [facility] pharmacy

## 2020-06-30 NOTE — TELEPHONE ENCOUNTER
Please advise.    I called to offer the patient an appointment, and he to me that due to his symptoms, he was unable to pass the covid screening yesterday. Patient states that these symptoms are normal for him when he has a flare up.    Patient is going to be tested today at 1 pm.

## 2020-06-30 NOTE — TELEPHONE ENCOUNTER
PT SENT THIS MESSAGE TO DR MCCLENDON YESTERDAY AND HE WANTED TO KNOW THE STATUS OF HIS MEDICATION REQUEST; I ADVISED WE WERE JUST WAITING FOR DR MCCLENDON TO GET BACK WITH US    COOKIE: [phone]    Hi, I have a Diverticulitis (flare-up). I’ve had this condition for many years, confirmed by Colonoscopy, I am familiar with the symptoms and what happens when a mild flare-up occurs. Saturday, I had the usual symptoms - diarrhea, cramping, bowel irritability, and pain. I have medication for the pain, and the [facility]omomaggie Quesada prescribed, I have two tablets of LevoFLOXacin left over from the [facility] Quesada refilled them December 5th.  My had an MRI in October 2018 ER visit that confirmed a flare up. This time I do NOT have Gastrointestinal Bleeding. Could you please [facility] for the same thing I was given before - It works, and clears me right up. LevoFLOXacin 750 MG tablets, and MetroNIDAZOLE 500 MG tablets.

## 2020-08-20 DIAGNOSIS — I10 ESSENTIAL HYPERTENSION: ICD-10-CM

## 2020-08-20 DIAGNOSIS — G40.909 SEIZURE DISORDER (HCC): ICD-10-CM

## 2020-08-20 RX ORDER — LORAZEPAM 2 MG/1
2 TABLET ORAL 2 TIMES DAILY PRN
Qty: 60 TABLET | Refills: 1 | Status: SHIPPED | OUTPATIENT
Start: 2020-08-20 | End: 2020-10-16 | Stop reason: SDUPTHER

## 2020-08-20 RX ORDER — LISINOPRIL 20 MG/1
20 TABLET ORAL DAILY
Qty: 90 TABLET | Refills: 0 | Status: SHIPPED | OUTPATIENT
Start: 2020-08-20 | End: 2020-11-13

## 2020-10-15 DIAGNOSIS — G40.909 SEIZURE DISORDER (HCC): ICD-10-CM

## 2020-10-15 RX ORDER — LORAZEPAM 2 MG/1
2 TABLET ORAL 2 TIMES DAILY PRN
Qty: 60 TABLET | Refills: 1 | OUTPATIENT
Start: 2020-10-15

## 2020-10-16 DIAGNOSIS — G40.909 SEIZURE DISORDER (HCC): ICD-10-CM

## 2020-10-16 RX ORDER — LORAZEPAM 2 MG/1
2 TABLET ORAL 2 TIMES DAILY PRN
Qty: 60 TABLET | Refills: 1 | OUTPATIENT
Start: 2020-10-16

## 2020-10-16 RX ORDER — LORAZEPAM 2 MG/1
2 TABLET ORAL 2 TIMES DAILY PRN
Qty: 60 TABLET | Refills: 1 | Status: SHIPPED | OUTPATIENT
Start: 2020-10-16 | End: 2020-12-10 | Stop reason: SDUPTHER

## 2020-10-22 ENCOUNTER — OFFICE VISIT (OUTPATIENT)
Dept: FAMILY MEDICINE CLINIC | Facility: CLINIC | Age: 60
End: 2020-10-22

## 2020-10-22 VITALS
HEART RATE: 116 BPM | RESPIRATION RATE: 14 BRPM | HEIGHT: 66 IN | OXYGEN SATURATION: 97 % | SYSTOLIC BLOOD PRESSURE: 132 MMHG | BODY MASS INDEX: 23.63 KG/M2 | WEIGHT: 147 LBS | DIASTOLIC BLOOD PRESSURE: 88 MMHG | TEMPERATURE: 97.7 F

## 2020-10-22 DIAGNOSIS — K21.9 GASTROESOPHAGEAL REFLUX DISEASE WITHOUT ESOPHAGITIS: ICD-10-CM

## 2020-10-22 DIAGNOSIS — R53.83 FATIGUE, UNSPECIFIED TYPE: ICD-10-CM

## 2020-10-22 DIAGNOSIS — G89.4 CHRONIC PAIN SYNDROME: ICD-10-CM

## 2020-10-22 DIAGNOSIS — I10 ESSENTIAL HYPERTENSION: Primary | ICD-10-CM

## 2020-10-22 DIAGNOSIS — E78.2 MIXED HYPERLIPIDEMIA: ICD-10-CM

## 2020-10-22 DIAGNOSIS — E55.9 VITAMIN D DEFICIENCY: ICD-10-CM

## 2020-10-22 PROCEDURE — 99214 OFFICE O/P EST MOD 30 MIN: CPT | Performed by: NURSE PRACTITIONER

## 2020-10-22 NOTE — PROGRESS NOTES
Subjective     Chief Complaint:    Chief Complaint   Patient presents with   • Hypertension   • Hyperlipidemia       History of Present Illness:   Here for f/u.   Has HTN. Takes lisinopril. BP normal at home.   Has BRIAN. Takes ativan. Works well.   [place] GERD. Takes prilosec. Still has symptoms when he eats pizza.    Has chronic pain. Takes tylenol #3 and tramadol. Also has pain pump. Follows with pain management in [place].    In the middle of his visit his daughter called upset about her dog being stolen. This induced his anxiety throughout the remainder of the visit.     Review of Systems  Gen- No fevers, chills  CV- No chest pain, palpitations  Resp- No cough, dyspnea  GI- No N/V/D, abd pain  Neuro-No dizziness, headaches      I have reviewed and/or updated the patient's past medical, surgical, family, social history and problem list as appropriate.     Medications:    Current Outpatient Medications:   •  acetaminophen-codeine (TYLENOL #3) 300-30 MG per tablet, Take 1 tablet by mouth Every 6 (Six) Hours As Needed for Moderate Pain (4-6)., Disp: , Rfl:   •  aspirin 81 MG EC tablet, Take 81 mg by mouth Daily., Disp: , Rfl:   •  famotidine (PEPCID AC) 10 MG tablet, Take 10 mg by mouth Daily As Needed for Heartburn., Disp: , Rfl:   •  lisinopril (PRINIVIL,ZESTRIL) 20 MG tablet, Take 1 tablet by mouth Daily., Disp: 90 tablet, Rfl: 0  •  LORazepam (ATIVAN) 2 MG tablet, Take 1 tablet by mouth 2 (Two) Times a Day As Needed for Anxiety., Disp: 60 tablet, Rfl: 1  •  Morphine 1 MG/ML solution, Infuse  into a venous catheter Continuous., Disp: , Rfl:   •  NARCAN 4 MG/0.1ML nasal spray, USE AS NEEDED -GO TO THE ER, Disp: , Rfl: 0  •  omeprazole (priLOSEC) 20 MG capsule, Take 20 mg by mouth Daily., Disp: , Rfl:   •  traMADol (ULTRAM) 50 MG tablet, TAKE ONE TABLET BY MOUTH THREE TIMES A DAY, Disp: , Rfl: 0    Allergies:  No Known Allergies    Objective     Vital Signs:   Vitals:    10/22/20 1710   BP: 132/88   Pulse: 116  "  Resp: 14   Temp: 97.7 °F (36.5 °C)   SpO2: 97%   Weight: 66.7 kg (147 lb)   Height: 167.6 cm (66\")       Physical Exam:    Physical Exam  Vitals signs and nursing note reviewed.   Constitutional:       Appearance: Normal appearance. He is well-developed.   HENT:      Head: Normocephalic and atraumatic.   Eyes:      Pupils: Pupils are equal, round, and reactive to light.   Neck:      Musculoskeletal: Neck supple.      Vascular: No carotid bruit.   Cardiovascular:      Rate and Rhythm: Normal rate and regular rhythm.      Pulses: Normal pulses.      Heart sounds: Normal heart sounds.   Pulmonary:      Effort: Pulmonary effort is normal.      Breath sounds: Normal breath sounds.   Abdominal:      General: Bowel sounds are normal. There is no distension.      Palpations: Abdomen is soft.      Tenderness: There is no abdominal tenderness.   Musculoskeletal:         General: No swelling.   Skin:     General: Skin is warm and dry.      Capillary Refill: Capillary refill takes less than 2 seconds.   Neurological:      General: No focal deficit present.      Mental Status: He is alert and oriented to person, place, and time.      Comments: Walks with a cane   Psychiatric:         Behavior: Behavior normal.      Comments: anxious         Assessment / Plan     Assessment/Plan:   Problem List Items Addressed This Visit        Cardiovascular and Mediastinum    Essential hypertension - Primary    Relevant Medications    lisinopril (PRINIVIL,ZESTRIL) 20 MG tablet    Other Relevant Orders    Comprehensive Metabolic Panel (Completed)    CBC (No Diff) (Completed)    Mixed hyperlipidemia    Relevant Orders    Lipid Panel (Completed)       Digestive    Gastroesophageal reflux disease without esophagitis    Relevant Medications    omeprazole (priLOSEC) 20 MG capsule    famotidine (PEPCID AC) 10 MG tablet       Nervous and Auditory    Chronic pain syndrome      Other Visit Diagnoses     Fatigue, unspecified type        Relevant Orders "    Vitamin B12 (Completed)    Folate (Completed)    TSH (Completed)    Vitamin D deficiency        Relevant Orders    Vitamin D 25 Hydroxy (Completed)        -- RTC for labs  -- BP stable. Continue current dose. Meds, diet, and lifestyle recommendation discussed at length. Home BP monitoring encouraged and appropriate intervals discussed.   -- continue Ativan for BRIAN. Discussed risk of tolerance.   -- continue ppi. - Avoid eating spicy foods, Avoid caffeinated beverages, Avoid carbonated beverage, Do not eat or drink within 2-3 hours of going to bed in the evening, Elevate head of bed on 4-6 inch blocks, Eat smaller portions of food throughout the day  -- continue follow up with pain management  Controlled Substance  Prescription  As part of this patient's treatment plan I am prescribing controlled substances. The patient has been made aware of appropriate use of such medications, including potential risk of somnolence, limited ability to drive and /or work safely, and potential for dependence or overdose. It has also been made clear that these medications are for use by this patient only, without concomitant use of alcohol or other substances unless prescribed. Marco A reviewed and appropriate     Follow up:  3 months    Electronically signed by DAVID Dobson   10/22/2020 17:46 EDT      Please note that portions of this note may have been completed with a voice recognition program. Efforts were made to edit the dictations, but occasionally words are mistranscribed.  Answers for HPI/ROS submitted by the patient on 10/20/2020   What is the primary reason for your visit?: Other  Please describe your symptoms.: General follow up appointment for current conditions.   Plus pain in my in both of my hands when I carve up a ham, turkey or chicken for dinner.  Feeling goes away after I stop.  Have you had these symptoms before?: Yes  How long have you been having these symptoms?: Greater than 2 weeks  Please list any  medications you are currently taking for this condition.: Same as before. No change.  Please describe any probable cause for these symptoms. : Unknown, getting older.  Tyler be [age] on December 10[birth year].

## 2020-10-23 ENCOUNTER — LAB (OUTPATIENT)
Dept: FAMILY MEDICINE CLINIC | Facility: CLINIC | Age: 60
End: 2020-10-23

## 2020-10-23 DIAGNOSIS — E55.9 VITAMIN D DEFICIENCY: ICD-10-CM

## 2020-10-23 DIAGNOSIS — I10 ESSENTIAL HYPERTENSION: ICD-10-CM

## 2020-10-23 DIAGNOSIS — E78.2 MIXED HYPERLIPIDEMIA: ICD-10-CM

## 2020-10-23 DIAGNOSIS — R53.83 FATIGUE, UNSPECIFIED TYPE: ICD-10-CM

## 2020-10-24 LAB
25(OH)D3+25(OH)D2 SERPL-MCNC: 30.4 NG/ML (ref 30–100)
ALBUMIN SERPL-MCNC: 4.6 G/DL (ref 3.5–5.2)
ALBUMIN/GLOB SERPL: 1.9 G/DL
ALP SERPL-CCNC: 77 U/L (ref 39–117)
ALT SERPL-CCNC: 21 U/L (ref 1–41)
AST SERPL-CCNC: 23 U/L (ref 1–40)
BILIRUB SERPL-MCNC: 0.3 MG/DL (ref 0–1.2)
BUN SERPL-MCNC: 11 MG/DL (ref 6–20)
BUN/CREAT SERPL: 10.5 (ref 7–25)
CALCIUM SERPL-MCNC: 9 MG/DL (ref 8.6–10.5)
CHLORIDE SERPL-SCNC: 104 MMOL/L (ref 98–107)
CHOLEST SERPL-MCNC: 216 MG/DL (ref 0–200)
CO2 SERPL-SCNC: 25.7 MMOL/L (ref 22–29)
CREAT SERPL-MCNC: 1.05 MG/DL (ref 0.76–1.27)
ERYTHROCYTE [DISTWIDTH] IN BLOOD BY AUTOMATED COUNT: 12.7 % (ref 12.3–15.4)
FOLATE SERPL-MCNC: 8.2 NG/ML (ref 4.78–24.2)
GLOBULIN SER CALC-MCNC: 2.4 GM/DL
GLUCOSE SERPL-MCNC: 85 MG/DL (ref 65–99)
HCT VFR BLD AUTO: 42.5 % (ref 37.5–51)
HDLC SERPL-MCNC: 37 MG/DL (ref 40–60)
HGB BLD-MCNC: 14.1 G/DL (ref 13–17.7)
LDLC SERPL CALC-MCNC: 148 MG/DL (ref 0–100)
MCH RBC QN AUTO: 32 PG (ref 26.6–33)
MCHC RBC AUTO-ENTMCNC: 33.2 G/DL (ref 31.5–35.7)
MCV RBC AUTO: 96.4 FL (ref 79–97)
PLATELET # BLD AUTO: 361 10*3/MM3 (ref 140–450)
POTASSIUM SERPL-SCNC: 4.1 MMOL/L (ref 3.5–5.2)
PROT SERPL-MCNC: 7 G/DL (ref 6–8.5)
RBC # BLD AUTO: 4.41 10*6/MM3 (ref 4.14–5.8)
SODIUM SERPL-SCNC: 139 MMOL/L (ref 136–145)
TRIGL SERPL-MCNC: 170 MG/DL (ref 0–150)
TSH SERPL DL<=0.005 MIU/L-ACNC: 0.98 UIU/ML (ref 0.27–4.2)
VIT B12 SERPL-MCNC: 495 PG/ML (ref 211–946)
VLDLC SERPL CALC-MCNC: 31 MG/DL (ref 5–40)
WBC # BLD AUTO: 5.66 10*3/MM3 (ref 3.4–10.8)

## 2020-10-26 NOTE — PROGRESS NOTES
Labs are unremarkable except cholesterol panel is elevated.  ASCVD 13.1% which indicates he is at an intermediate risk for heart attack or stroke.  Statin is recommended to decrease this risk.  Also recommend he work on clean eating, increasing physical activity as tolerates, minimizing sugary and starchy foods. Is he aggreable to cholesterol medication?

## 2020-10-30 DIAGNOSIS — Z51.81 ENCOUNTER FOR THERAPEUTIC DRUG MONITORING: Primary | ICD-10-CM

## 2020-11-13 DIAGNOSIS — I10 ESSENTIAL HYPERTENSION: ICD-10-CM

## 2020-11-13 RX ORDER — LISINOPRIL 20 MG/1
TABLET ORAL
Qty: 90 TABLET | Refills: 2 | Status: SHIPPED | OUTPATIENT
Start: 2020-11-13 | End: 2021-07-26 | Stop reason: SDUPTHER

## 2020-12-10 DIAGNOSIS — G40.909 SEIZURE DISORDER (HCC): ICD-10-CM

## 2020-12-11 RX ORDER — LORAZEPAM 2 MG/1
2 TABLET ORAL 2 TIMES DAILY PRN
Qty: 60 TABLET | Refills: 3 | Status: SHIPPED | OUTPATIENT
Start: 2020-12-11 | End: 2021-04-01 | Stop reason: SDUPTHER

## 2021-03-02 ENCOUNTER — IMMUNIZATION (OUTPATIENT)
Dept: VACCINE CLINIC | Facility: HOSPITAL | Age: 61
End: 2021-03-02

## 2021-03-02 PROCEDURE — 0001A: CPT | Performed by: INTERNAL MEDICINE

## 2021-03-02 PROCEDURE — 91300 HC SARSCOV02 VAC 30MCG/0.3ML IM: CPT | Performed by: INTERNAL MEDICINE

## 2021-03-08 ENCOUNTER — OFFICE VISIT (OUTPATIENT)
Dept: FAMILY MEDICINE CLINIC | Facility: CLINIC | Age: 61
End: 2021-03-08

## 2021-03-08 VITALS
TEMPERATURE: 97.3 F | DIASTOLIC BLOOD PRESSURE: 80 MMHG | OXYGEN SATURATION: 98 % | BODY MASS INDEX: 23.63 KG/M2 | HEART RATE: 116 BPM | WEIGHT: 147 LBS | HEIGHT: 66 IN | SYSTOLIC BLOOD PRESSURE: 140 MMHG

## 2021-03-08 DIAGNOSIS — E78.2 MIXED HYPERLIPIDEMIA: ICD-10-CM

## 2021-03-08 DIAGNOSIS — I10 ESSENTIAL HYPERTENSION: Primary | ICD-10-CM

## 2021-03-08 DIAGNOSIS — E78.5 HYPERLIPIDEMIA, UNSPECIFIED HYPERLIPIDEMIA TYPE: ICD-10-CM

## 2021-03-08 DIAGNOSIS — G89.4 CHRONIC PAIN SYNDROME: ICD-10-CM

## 2021-03-08 DIAGNOSIS — F41.1 GAD (GENERALIZED ANXIETY DISORDER): ICD-10-CM

## 2021-03-08 DIAGNOSIS — K21.9 GASTROESOPHAGEAL REFLUX DISEASE WITHOUT ESOPHAGITIS: ICD-10-CM

## 2021-03-08 PROBLEM — K62.5 BRBPR (BRIGHT RED BLOOD PER RECTUM): Status: RESOLVED | Noted: 2018-10-16 | Resolved: 2021-03-08

## 2021-03-08 PROBLEM — Z87.19 HISTORY OF DIVERTICULITIS: Status: RESOLVED | Noted: 2017-05-01 | Resolved: 2021-03-08

## 2021-03-08 PROCEDURE — 99214 OFFICE O/P EST MOD 30 MIN: CPT | Performed by: NURSE PRACTITIONER

## 2021-03-08 NOTE — PROGRESS NOTES
Subjective     Chief Complaint:    Chief Complaint   Patient presents with   • Hypertension       History of Present Illness:   Here for f/u.   Has HTN. Takes lisinopril. Checks his BP at home. Has been in the 130's. Heart rate normal as well at home.    Has BRIAN. Takes ativan. Works well. Also has hx of seizure disorder after spinal cord. No breakthrough seizures. Mood is well controlled.   [place] GERD. Takes pepcid as needed. Helps. Takes prilosec as needed.   Has chronic pain. Takes tylenol #3 and tramadol. Also has pain pump. Follows with pain management in [place]  He is excited because he got his first covid vaccine last week.     Answers for HPI/ROS submitted by the patient on 3/8/2021  What is the primary reason for your visit?: Other  Please describe your symptoms.: Ok, just a Follow up visit.  Have you had these symptoms before?: No  How long have you been having these symptoms?: Greater than 2 weeks  Please list any medications you are currently taking for this condition.: N/A  Please describe any probable cause for these symptoms. : N/A        Review of Systems  Gen- No fevers, chills  CV- No chest pain, palpitations  Resp- No cough, dyspnea  GI- No N/V/D, abd pain  Neuro-No dizziness, headaches      I have reviewed and/or updated the patient's past medical, surgical, family, social history and problem list as appropriate.     Medications:    Current Outpatient Medications:   •  acetaminophen-codeine (TYLENOL #3) 300-30 MG per tablet, Take 1 tablet by mouth Every 6 (Six) Hours As Needed for Moderate Pain (4-6)., Disp: , Rfl:   •  aspirin 81 MG EC tablet, Take 81 mg by mouth Daily., Disp: , Rfl:   •  famotidine (PEPCID AC) 10 MG tablet, Take 10 mg by mouth Daily As Needed for Heartburn., Disp: , Rfl:   •  lisinopril (PRINIVIL,ZESTRIL) 20 MG tablet, TAKE 1 TABLET BY MOUTH EVERY DAY, Disp: 90 tablet, Rfl: 2  •  LORazepam (ATIVAN) 2 MG tablet, Take 1 tablet by mouth 2 (Two) Times a Day As Needed for  "Anxiety., Disp: 60 tablet, Rfl: 3  •  Morphine 1 MG/ML solution, Infuse  into a venous catheter Continuous., Disp: , Rfl:   •  NARCAN 4 MG/0.1ML nasal spray, USE AS NEEDED -GO TO THE ER, Disp: , Rfl: 0  •  omeprazole (priLOSEC) 20 MG capsule, Take 20 mg by mouth Daily., Disp: , Rfl:   •  traMADol (ULTRAM) 50 MG tablet, TAKE ONE TABLET BY MOUTH THREE TIMES A DAY, Disp: , Rfl: 0    Allergies:  No Known Allergies    Objective     Vital Signs:   Vitals:    03/08/21 1705   BP: 140/80   Pulse: 116   Temp: 97.3 °F (36.3 °C)   SpO2: 98%   Weight: 66.7 kg (147 lb)   Height: 167.6 cm (66\")   PainSc: 0-No pain       Physical Exam:    Physical Exam  Vitals and nursing note reviewed.   Constitutional:       Appearance: He is well-developed.   HENT:      Head: Normocephalic and atraumatic.   Eyes:      Pupils: Pupils are equal, round, and reactive to light.   Cardiovascular:      Rate and Rhythm: Normal rate and regular rhythm.      Heart sounds: Normal heart sounds.   Pulmonary:      Effort: Pulmonary effort is normal.      Breath sounds: Normal breath sounds.   Abdominal:      General: Bowel sounds are normal. There is no distension.      Palpations: Abdomen is soft.      Tenderness: There is no abdominal tenderness.   Musculoskeletal:      Cervical back: Neck supple.   Skin:     General: Skin is warm and dry.   Neurological:      Mental Status: He is alert and oriented to person, place, and time.   Psychiatric:         Behavior: Behavior normal.         Assessment / Plan     Assessment/Plan:   Problem List Items Addressed This Visit        Cardiac and Vasculature    Essential hypertension - Primary    Relevant Medications    lisinopril (PRINIVIL,ZESTRIL) 20 MG tablet    Mixed hyperlipidemia       Gastrointestinal Abdominal     Gastroesophageal reflux disease without esophagitis    Relevant Medications    omeprazole (priLOSEC) 20 MG capsule    famotidine (PEPCID AC) 10 MG tablet       Mental Health    BRIAN (generalized anxiety " disorder)    Relevant Medications    LORazepam (ATIVAN) 2 MG tablet       Neuro    Chronic pain syndrome      Other Visit Diagnoses     Hyperlipidemia, unspecified hyperlipidemia type            -- BP controlled. Continue lisinopril. Meds, diet, and lifestyle recommendation discussed at length. Home BP monitoring encouraged and appropriate intervals discussed.   -- HLD controlled with diet.   -- continue pepcid and prilosec. He is uses prn  -- continue ativan. narc contract and UDS UTD.  -- continue f/u with pain management  Controlled Substance  Prescription  As part of this patient's treatment plan I am prescribing controlled substances. The patient has been made aware of appropriate use of such medications, including potential risk of somnolence, limited ability to drive and /or work safely, and potential for dependence or overdose. It has also been made clear that these medications are for use by this patient only, without concomitant use of alcohol or other substances unless prescribed. Marco A reviewed and appropriate       Follow up:  Return in about 6 months (around 9/8/2021) for Medicare Wellness.    Electronically signed by DAVID Dobson   03/08/2021 17:08 EST      Please note that portions of this note may have been completed with a voice recognition program. Efforts were made to edit the dictations, but occasionally words are mistranscribed.

## 2021-03-23 ENCOUNTER — IMMUNIZATION (OUTPATIENT)
Dept: VACCINE CLINIC | Facility: HOSPITAL | Age: 61
End: 2021-03-23

## 2021-03-23 PROCEDURE — 91300 HC SARSCOV02 VAC 30MCG/0.3ML IM: CPT | Performed by: INTERNAL MEDICINE

## 2021-03-23 PROCEDURE — 0002A: CPT | Performed by: INTERNAL MEDICINE

## 2021-04-01 DIAGNOSIS — G40.909 SEIZURE DISORDER (HCC): ICD-10-CM

## 2021-04-02 RX ORDER — LORAZEPAM 2 MG/1
2 TABLET ORAL 2 TIMES DAILY PRN
Qty: 60 TABLET | Refills: 3 | Status: SHIPPED | OUTPATIENT
Start: 2021-04-02 | End: 2021-07-26 | Stop reason: SDUPTHER

## 2021-07-26 DIAGNOSIS — G40.909 SEIZURE DISORDER (HCC): ICD-10-CM

## 2021-07-26 DIAGNOSIS — I10 ESSENTIAL HYPERTENSION: ICD-10-CM

## 2021-07-26 RX ORDER — LISINOPRIL 20 MG/1
20 TABLET ORAL DAILY
Qty: 90 TABLET | Refills: 2 | Status: SHIPPED | OUTPATIENT
Start: 2021-07-26 | End: 2022-01-24

## 2021-07-26 RX ORDER — LORAZEPAM 2 MG/1
2 TABLET ORAL 2 TIMES DAILY PRN
Qty: 60 TABLET | Refills: 2 | Status: SHIPPED | OUTPATIENT
Start: 2021-07-26 | End: 2021-10-18 | Stop reason: SDUPTHER

## 2021-08-30 ENCOUNTER — TELEMEDICINE (OUTPATIENT)
Dept: FAMILY MEDICINE CLINIC | Facility: CLINIC | Age: 61
End: 2021-08-30

## 2021-08-30 DIAGNOSIS — G89.4 CHRONIC PAIN SYNDROME: Primary | ICD-10-CM

## 2021-08-30 DIAGNOSIS — Z87.19 HISTORY OF DIVERTICULITIS: ICD-10-CM

## 2021-08-30 DIAGNOSIS — T85.738S: ICD-10-CM

## 2021-08-30 PROCEDURE — 99213 OFFICE O/P EST LOW 20 MIN: CPT | Performed by: NURSE PRACTITIONER

## 2021-08-30 RX ORDER — DIPHENOXYLATE HYDROCHLORIDE AND ATROPINE SULFATE 2.5; .025 MG/1; MG/1
1 TABLET ORAL 4 TIMES DAILY PRN
Qty: 30 TABLET | Refills: 0
Start: 2021-08-30 | End: 2022-03-11 | Stop reason: SDUPTHER
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
## 2021-09-08 ENCOUNTER — OFFICE VISIT (OUTPATIENT)
Dept: FAMILY MEDICINE CLINIC | Facility: CLINIC | Age: 61
End: 2021-09-08

## 2021-09-08 VITALS
TEMPERATURE: 98 F | WEIGHT: 148 LBS | HEART RATE: 88 BPM | DIASTOLIC BLOOD PRESSURE: 86 MMHG | BODY MASS INDEX: 23.78 KG/M2 | HEIGHT: 66 IN | SYSTOLIC BLOOD PRESSURE: 138 MMHG | OXYGEN SATURATION: 97 %

## 2021-09-08 DIAGNOSIS — Z00.00 MEDICARE ANNUAL WELLNESS VISIT, SUBSEQUENT: Primary | ICD-10-CM

## 2021-09-08 DIAGNOSIS — Z87.828 HISTORY OF SPINAL CORD INJURY: ICD-10-CM

## 2021-09-08 DIAGNOSIS — E78.2 MIXED HYPERLIPIDEMIA: ICD-10-CM

## 2021-09-08 DIAGNOSIS — F41.1 GAD (GENERALIZED ANXIETY DISORDER): ICD-10-CM

## 2021-09-08 DIAGNOSIS — E53.8 B12 DEFICIENCY: ICD-10-CM

## 2021-09-08 DIAGNOSIS — R53.83 FATIGUE, UNSPECIFIED TYPE: ICD-10-CM

## 2021-09-08 DIAGNOSIS — G40.109 AUTONOMIC EPILEPTIC SEIZURES (HCC): ICD-10-CM

## 2021-09-08 DIAGNOSIS — G89.4 CHRONIC PAIN SYNDROME: ICD-10-CM

## 2021-09-08 DIAGNOSIS — I10 ESSENTIAL HYPERTENSION: ICD-10-CM

## 2021-09-08 DIAGNOSIS — K21.9 GASTROESOPHAGEAL REFLUX DISEASE WITHOUT ESOPHAGITIS: ICD-10-CM

## 2021-09-08 DIAGNOSIS — E55.9 VITAMIN D DEFICIENCY: ICD-10-CM

## 2021-09-08 DIAGNOSIS — R20.0 RIGHT LEG NUMBNESS: ICD-10-CM

## 2021-09-08 PROCEDURE — 1170F FXNL STATUS ASSESSED: CPT | Performed by: NURSE PRACTITIONER

## 2021-09-08 PROCEDURE — 1159F MED LIST DOCD IN RCRD: CPT | Performed by: NURSE PRACTITIONER

## 2021-09-08 PROCEDURE — 1125F AMNT PAIN NOTED PAIN PRSNT: CPT | Performed by: NURSE PRACTITIONER

## 2021-09-08 PROCEDURE — G0439 PPPS, SUBSEQ VISIT: HCPCS | Performed by: NURSE PRACTITIONER

## 2021-09-08 PROCEDURE — 96160 PT-FOCUSED HLTH RISK ASSMT: CPT | Performed by: NURSE PRACTITIONER

## 2021-09-08 PROCEDURE — 99396 PREV VISIT EST AGE 40-64: CPT | Performed by: NURSE PRACTITIONER

## 2021-09-08 RX ORDER — LANOLIN ALCOHOL/MO/W.PET/CERES
1000 CREAM (GRAM) TOPICAL DAILY
COMMUNITY
End: 2022-09-20

## 2021-09-08 NOTE — PROGRESS NOTES
The ABCs of the Annual Wellness Visit  Subsequent Medicare Wellness Visit    Chief Complaint   Patient presents with   • Medicare Wellness-subsequent     Labs; c/o BLE numbness/tingling      Subjective    History of Present Illness:  Jonas Terrazas is a 60 y.o. male who presents for a Subsequent Medicare Wellness Visit.  Here for f/u. Has anxiety. Takes ativan. Well controlled.   Has chronic pain. Follows with pain amangement and has pain pump. Takes tramadol and tylenol #3 as needed. HAs new referral pending with pain management in [place] to be closer to home and manage pain  Has hx of epileptic seizure and has not had any for several years. Ativan helps.   He has HTN. Takes lisinopril  Has GERD. Takes PPI.   Uses lomotil very rarely for diarrhea.   He conitnues to have BLE numbness and tingling. Has had for awhile but seems worse. Seems worse in the evening. Does have some weakness in right leg as well. Spinal cord injury was 1991. He has seen several neurologist and neurosurgeon several times over the years.     The following portions of the patient's history were reviewed and   updated as appropriate: allergies, current medications, past family history, past medical history, past social history, past surgical history and problem list.     Compared to one year ago, the patient feels his physical   health is the same.    Compared to one year ago, the patient feels his mental   health is the same.    Recent Hospitalizations:  He was not admitted to the hospital during the last year.       Current Medical Providers:  Patient Care Team:  Gita Quesada APRN as PCP - General (Family Medicine)    Outpatient Medications Prior to Visit   Medication Sig Dispense Refill   • acetaminophen-codeine (TYLENOL #3) 300-30 MG per tablet Take 1 tablet by mouth Every 6 (Six) Hours As Needed for Moderate Pain (4-6).     • aspirin 81 MG EC tablet Take 81 mg by mouth Daily.     • Bioflavonoid Products (VITAMIN C PLUS PO)  Take  by mouth.     • diphenoxylate-atropine (LOMOTIL) 2.5-0.025 MG per tablet Take 1 tablet by mouth 4 (Four) Times a Day As Needed for Diarrhea for up to 30 days. 30 tablet 0   • famotidine (PEPCID AC) 10 MG tablet Take 10 mg by mouth Daily As Needed for Heartburn.     • lisinopril (PRINIVIL,ZESTRIL) 20 MG tablet Take 1 tablet by mouth Daily. 90 tablet 2   • LORazepam (ATIVAN) 2 MG tablet Take 1 tablet by mouth 2 (Two) Times a Day As Needed for Anxiety. 60 tablet 2   • Morphine 1 MG/ML solution Infuse  into a venous catheter Continuous.     • NARCAN 4 MG/0.1ML nasal spray USE AS NEEDED -GO TO THE ER  0   • omeprazole (priLOSEC) 20 MG capsule Take 20 mg by mouth Daily.     • traMADol (ULTRAM) 50 MG tablet TAKE ONE TABLET BY MOUTH THREE TIMES A DAY  0   • vitamin B-12 (CYANOCOBALAMIN) 1000 MCG tablet Take 1,000 mcg by mouth Daily.       No facility-administered medications prior to visit.       Opioid medication/s are on active medication list.  and I have evaluated his active treatment plan and pain score trends (see table).  Vitals:    09/08/21 1453   PainSc:   4     I have reviewed the chart for potential of high risk medication and harmful drug interactions in the elderly.            Aspirin is on active medication list. Aspirin use is indicated based on review of current medical condition/s. Pros and cons of this therapy have been discussed today. Benefits of this medication outweigh potential harm.  Patient has been encouraged to continue taking this medication.  .      Patient Active Problem List   Diagnosis   • Essential hypertension   • Gastroesophageal reflux disease without esophagitis   • Hemiparesis (CMS/HCC)   • Mixed hyperlipidemia   • Autonomic epileptic seizures (CMS/HCC)   • Chronic pain syndrome   • Vasovagal syncope   • Colitis   • History of spinal cord injury   • Macrocytic anemia with vitamin B12 deficiency   • BRIAN (generalized anxiety disorder)     Advance Care Planning   Advance Directive is  "not on file.  ACP discussion was held with the patient during this visit. Patient has an advance directive (not in EMR), copy requested.          Objective       Vitals:    09/08/21 1453   BP: 138/86   Pulse: 88   Temp: 98 °F (36.7 °C)   SpO2: 97%   Weight: 67.1 kg (148 lb)   Height: 167.6 cm (66\")   PainSc:   4     BMI Readings from Last 1 Encounters:   09/08/21 23.89 kg/m²   BMI is within normal parameters. No follow-up required.    Does the patient have evidence of cognitive impairment? No     ROS:  Gen- No fevers, chills  CV- No chest pain, palpitations  Resp- No cough, dyspnea  GI- No N/V/D, abd pain  Neuro-No dizziness, headaches      Physical Exam  Constitutional:       Appearance: Normal appearance. He is well-developed.   HENT:      Head: Normocephalic and atraumatic.      Right Ear: Tympanic membrane, ear canal and external ear normal.      Left Ear: Tympanic membrane, ear canal and external ear normal.      Ears:      Comments: Hearing aides present     Nose: Nose normal.      Mouth/Throat:      Pharynx: Uvula midline.   Eyes:      Pupils: Pupils are equal, round, and reactive to light.   Cardiovascular:      Rate and Rhythm: Normal rate and regular rhythm.      Pulses: Normal pulses.      Heart sounds: Normal heart sounds. No murmur heard.   No friction rub. No gallop.    Pulmonary:      Effort: Pulmonary effort is normal.      Breath sounds: Normal breath sounds.   Abdominal:      General: Bowel sounds are normal.      Palpations: Abdomen is soft.      Tenderness: There is no abdominal tenderness.   Musculoskeletal:      Cervical back: Neck supple.   Lymphadenopathy:      Head:      Right side of head: No submental, submandibular, tonsillar, preauricular or posterior auricular adenopathy.      Left side of head: No submental, submandibular, tonsillar, preauricular or posterior auricular adenopathy.      Cervical: No cervical adenopathy.   Skin:     General: Skin is warm and dry.      Capillary Refill: " Capillary refill takes less than 2 seconds.   Neurological:      General: No focal deficit present.      Mental Status: He is alert and oriented to person, place, and time.      Gait: Gait abnormal (uses cane, right leg weakness).   Psychiatric:         Mood and Affect: Mood normal.         Behavior: Behavior normal.       Lab Results   Component Value Date    GLU 96 09/08/2021    CHLPL 238 (H) 09/08/2021    TRIG 178 (H) 09/08/2021    HDL 34 (L) 09/08/2021     (H) 09/08/2021    VLDL 33 09/08/2021            HEALTH RISK ASSESSMENT    Smoking Status:  Social History     Tobacco Use   Smoking Status Never Smoker   Smokeless Tobacco Never Used     Alcohol Consumption:  Social History     Substance and Sexual Activity   Alcohol Use No     Fall Risk Screen:    TANIYA Fall Risk Assessment has not been completed.    Depression Screening:  PHQ-2/PHQ-9 Depression Screening 9/8/2021   Little interest or pleasure in doing things 0   Feeling down, depressed, or hopeless 0   Total Score 0       Health Habits and Functional and Cognitive Screening:  Functional & Cognitive Status 9/8/2021   Do you have difficulty preparing food and eating? No   Do you have difficulty bathing yourself, getting dressed or grooming yourself? Yes   Do you have difficulty using the toilet? No   Do you have difficulty moving around from place to place? Yes   Do you have trouble with steps or getting out of a bed or a chair? Yes   Current Diet Well Balanced Diet   Dental Exam Not up to date        Dental Exam Comment dentures   Eye Exam Up to date   Exercise (times per week) 7 times per week   Current Exercises Include Walking        Exercise Comment gardening   Do you need help using the phone?  No   Are you deaf or do you have serious difficulty hearing?  No   Do you need help with transportation? No   Do you need help shopping? No   Do you need help preparing meals?  No   Do you need help with housework?  No   Do you need help with laundry? No    Do you need help taking your medications? No   Do you need help managing money? No   Do you ever drive or ride in a car without wearing a seat belt? No   Have you felt unusual stress, anger or loneliness in the last month? No   Who do you live with? Spouse   If you need help, do you have trouble finding someone available to you? No   Do you have difficulty concentrating, remembering or making decisions? No       Age-appropriate Screening Schedule:  Refer to the list below for future screening recommendations based on patient's age, sex and/or medical conditions. Orders for these recommended tests are listed in the plan section. The patient has been provided with a written plan.    Health Maintenance   Topic Date Due   • INFLUENZA VACCINE  10/01/2021   • TDAP/TD VACCINES (2 - Td or Tdap) 06/12/2022   • LIPID PANEL  09/08/2022   • ZOSTER VACCINE  Discontinued              Assessment/Plan     CMS Preventative Services Quick Reference  Risk Factors Identified During Encounter  anxiety, chronic pain  The above risks/problems have been discussed with the patient.  Follow up actions/plans if indicated are seen below in the Assessment/Plan Section.  Pertinent information has been shared with the patient in the After Visit Summary.    Diagnoses and all orders for this visit:    1. Medicare annual wellness visit, subsequent (Primary)    2. Essential hypertension  -     Comprehensive Metabolic Panel  -     Cancel: CBC Auto Differential  -     Lipid Panel    3. Gastroesophageal reflux disease without esophagitis    4. Mixed hyperlipidemia  -     Lipid Panel    5. Chronic pain syndrome    6. Autonomic epileptic seizures (CMS/HCC)    7. History of spinal cord injury    8. BRIAN (generalized anxiety disorder)    9. Right leg numbness    10. Fatigue, unspecified type  -     Vitamin B12  -     Folate  -     TSH Rfx On Abnormal To Free T4    11. Vitamin D deficiency  -     Vitamin D 25 Hydroxy    12. B12 deficiency  -     Vitamin  B12  -     Folate    Other orders  -     CBC & Differential      -- orders as above  -- continue ativan for anxiety and seizures, well controlled  -- continue lisinopril for HTN, well controlled  -- he has pending pain mangement appt in [place]  -- his neurological symptoms are chronic, will check labs, he has had extensive workup in the past, ok to watch for now, if worsens need to reconsider going back to [place]  -- continue to stay active, hydrated, and making good dietary choices    Follow Up:   Return in about 6 months (around 3/8/2022).     An After Visit Summary and PPPS were given to the patient.

## 2021-09-09 LAB
25(OH)D3+25(OH)D2 SERPL-MCNC: 24.8 NG/ML (ref 30–100)
ALBUMIN SERPL-MCNC: 4.5 G/DL (ref 3.8–4.9)
ALBUMIN/GLOB SERPL: 1.6 {RATIO} (ref 1.2–2.2)
ALP SERPL-CCNC: 81 IU/L (ref 48–121)
ALT SERPL-CCNC: 19 IU/L (ref 0–44)
AST SERPL-CCNC: 19 IU/L (ref 0–40)
BASOPHILS # BLD AUTO: 0 X10E3/UL (ref 0–0.2)
BASOPHILS NFR BLD AUTO: 1 %
BILIRUB SERPL-MCNC: 0.3 MG/DL (ref 0–1.2)
BUN SERPL-MCNC: 11 MG/DL (ref 8–27)
BUN/CREAT SERPL: 11 (ref 10–24)
CALCIUM SERPL-MCNC: 9.4 MG/DL (ref 8.6–10.2)
CHLORIDE SERPL-SCNC: 103 MMOL/L (ref 96–106)
CHOLEST SERPL-MCNC: 238 MG/DL (ref 100–199)
CO2 SERPL-SCNC: 23 MMOL/L (ref 20–29)
CREAT SERPL-MCNC: 0.99 MG/DL (ref 0.76–1.27)
EOSINOPHIL # BLD AUTO: 0.2 X10E3/UL (ref 0–0.4)
EOSINOPHIL NFR BLD AUTO: 3 %
ERYTHROCYTE [DISTWIDTH] IN BLOOD BY AUTOMATED COUNT: 12.8 % (ref 11.6–15.4)
FOLATE SERPL-MCNC: 10 NG/ML
GLOBULIN SER CALC-MCNC: 2.9 G/DL (ref 1.5–4.5)
GLUCOSE SERPL-MCNC: 96 MG/DL (ref 65–99)
HCT VFR BLD AUTO: 42.3 % (ref 37.5–51)
HDLC SERPL-MCNC: 34 MG/DL
HGB BLD-MCNC: 14.3 G/DL (ref 13–17.7)
IMM GRANULOCYTES # BLD AUTO: 0 X10E3/UL (ref 0–0.1)
IMM GRANULOCYTES NFR BLD AUTO: 0 %
LDLC SERPL CALC-MCNC: 171 MG/DL (ref 0–99)
LYMPHOCYTES # BLD AUTO: 2.4 X10E3/UL (ref 0.7–3.1)
LYMPHOCYTES NFR BLD AUTO: 35 %
MCH RBC QN AUTO: 32.9 PG (ref 26.6–33)
MCHC RBC AUTO-ENTMCNC: 33.8 G/DL (ref 31.5–35.7)
MCV RBC AUTO: 97 FL (ref 79–97)
MONOCYTES # BLD AUTO: 0.4 X10E3/UL (ref 0.1–0.9)
MONOCYTES NFR BLD AUTO: 7 %
NEUTROPHILS # BLD AUTO: 3.7 X10E3/UL (ref 1.4–7)
NEUTROPHILS NFR BLD AUTO: 54 %
PLATELET # BLD AUTO: 356 X10E3/UL (ref 150–450)
POTASSIUM SERPL-SCNC: 4.4 MMOL/L (ref 3.5–5.2)
PROT SERPL-MCNC: 7.4 G/DL (ref 6–8.5)
RBC # BLD AUTO: 4.35 X10E6/UL (ref 4.14–5.8)
SODIUM SERPL-SCNC: 140 MMOL/L (ref 134–144)
TRIGL SERPL-MCNC: 178 MG/DL (ref 0–149)
TSH SERPL DL<=0.005 MIU/L-ACNC: 0.94 UIU/ML (ref 0.45–4.5)
VIT B12 SERPL-MCNC: 711 PG/ML (ref 232–1245)
VLDLC SERPL CALC-MCNC: 33 MG/DL (ref 5–40)
WBC # BLD AUTO: 6.8 X10E3/UL (ref 3.4–10.8)

## 2021-09-10 NOTE — PROGRESS NOTES
Your labs show vitamin D deficiency. I recommend taking 4000 mg of vitamin D3 daily. Your cholesterol was also very elevated. LDL was 171. Given this and your history of high blood pressure I think a cholesterol medication should be considered. Please let me know how you want to proceed.

## 2021-10-18 DIAGNOSIS — G40.909 SEIZURE DISORDER (HCC): ICD-10-CM

## 2021-10-18 RX ORDER — LORAZEPAM 2 MG/1
2 TABLET ORAL 2 TIMES DAILY PRN
Qty: 60 TABLET | Refills: 2 | Status: SHIPPED | OUTPATIENT
Start: 2021-10-18 | End: 2022-01-07 | Stop reason: SDUPTHER

## 2022-01-07 DIAGNOSIS — G40.909 SEIZURE DISORDER: ICD-10-CM

## 2022-01-07 RX ORDER — LORAZEPAM 2 MG/1
2 TABLET ORAL 2 TIMES DAILY PRN
Qty: 60 TABLET | Refills: 2 | Status: SHIPPED | OUTPATIENT
Start: 2022-01-07 | End: 2022-04-05 | Stop reason: SDUPTHER

## 2022-01-07 NOTE — TELEPHONE ENCOUNTER
Rx Refill Note  Requested Prescriptions     Pending Prescriptions Disp Refills   • LORazepam (ATIVAN) 2 MG tablet 60 tablet 2     Sig: Take 1 tablet by mouth 2 (Two) Times a Day As Needed for Anxiety.      Last office visit with prescribing clinician: 9/8/2021      Next office visit with prescribing clinician: 3/8/2022    Ok to fill?    uds is not up to date.    Csa is up to date.  Ara Mcnair MA  01/07/22, 15:59 EST

## 2022-01-22 DIAGNOSIS — I10 ESSENTIAL HYPERTENSION: ICD-10-CM

## 2022-01-24 RX ORDER — LISINOPRIL 20 MG/1
TABLET ORAL
Qty: 90 TABLET | Refills: 2 | Status: SHIPPED | OUTPATIENT
Start: 2022-01-24 | End: 2022-09-20

## 2022-03-11 ENCOUNTER — OFFICE VISIT (OUTPATIENT)
Dept: FAMILY MEDICINE CLINIC | Facility: CLINIC | Age: 62
End: 2022-03-11

## 2022-03-11 VITALS
DIASTOLIC BLOOD PRESSURE: 75 MMHG | WEIGHT: 146.2 LBS | HEIGHT: 66 IN | SYSTOLIC BLOOD PRESSURE: 130 MMHG | OXYGEN SATURATION: 97 % | HEART RATE: 83 BPM | TEMPERATURE: 97.3 F | BODY MASS INDEX: 23.5 KG/M2

## 2022-03-11 DIAGNOSIS — Z87.19 HISTORY OF DIVERTICULITIS: ICD-10-CM

## 2022-03-11 DIAGNOSIS — G89.4 CHRONIC PAIN SYNDROME: ICD-10-CM

## 2022-03-11 DIAGNOSIS — F41.1 GAD (GENERALIZED ANXIETY DISORDER): ICD-10-CM

## 2022-03-11 DIAGNOSIS — Z79.899 ENCOUNTER FOR LONG-TERM (CURRENT) USE OF OTHER MEDICATIONS: ICD-10-CM

## 2022-03-11 DIAGNOSIS — E78.2 MIXED HYPERLIPIDEMIA: ICD-10-CM

## 2022-03-11 DIAGNOSIS — D51.8 MACROCYTIC ANEMIA WITH VITAMIN B12 DEFICIENCY: ICD-10-CM

## 2022-03-11 DIAGNOSIS — Z87.828 HISTORY OF SPINAL CORD INJURY: ICD-10-CM

## 2022-03-11 DIAGNOSIS — G40.109 AUTONOMIC EPILEPTIC SEIZURES: ICD-10-CM

## 2022-03-11 DIAGNOSIS — K21.9 GASTROESOPHAGEAL REFLUX DISEASE WITHOUT ESOPHAGITIS: ICD-10-CM

## 2022-03-11 DIAGNOSIS — I10 ESSENTIAL HYPERTENSION: Primary | ICD-10-CM

## 2022-03-11 PROCEDURE — 99214 OFFICE O/P EST MOD 30 MIN: CPT | Performed by: NURSE PRACTITIONER

## 2022-03-11 RX ORDER — DIPHENOXYLATE HYDROCHLORIDE AND ATROPINE SULFATE 2.5; .025 MG/1; MG/1
1 TABLET ORAL 4 TIMES DAILY PRN
Qty: 30 TABLET | Refills: 0 | Status: SHIPPED | OUTPATIENT
Start: 2022-03-11

## 2022-03-21 LAB
DRUGS UR: NORMAL
Lab: NORMAL

## 2022-03-28 ENCOUNTER — OFFICE VISIT (OUTPATIENT)
Dept: FAMILY MEDICINE CLINIC | Facility: CLINIC | Age: 62
End: 2022-03-28

## 2022-03-28 VITALS
TEMPERATURE: 97.8 F | HEART RATE: 144 BPM | HEIGHT: 66 IN | DIASTOLIC BLOOD PRESSURE: 95 MMHG | BODY MASS INDEX: 23.46 KG/M2 | SYSTOLIC BLOOD PRESSURE: 160 MMHG | OXYGEN SATURATION: 99 % | WEIGHT: 146 LBS

## 2022-03-28 DIAGNOSIS — K64.5 THROMBOSED HEMORRHOIDS: Primary | ICD-10-CM

## 2022-03-28 PROCEDURE — 99214 OFFICE O/P EST MOD 30 MIN: CPT | Performed by: NURSE PRACTITIONER

## 2022-03-28 RX ORDER — HYDROCORTISONE 25 MG/G
CREAM TOPICAL 2 TIMES DAILY
Qty: 28 G | Refills: 0 | Status: SHIPPED | OUTPATIENT
Start: 2022-03-28 | End: 2022-09-20

## 2022-04-05 DIAGNOSIS — G40.909 SEIZURE DISORDER: ICD-10-CM

## 2022-04-06 RX ORDER — LORAZEPAM 2 MG/1
2 TABLET ORAL 2 TIMES DAILY PRN
Qty: 60 TABLET | Refills: 2 | Status: SHIPPED | OUTPATIENT
Start: 2022-04-06 | End: 2022-06-30 | Stop reason: SDUPTHER

## 2022-04-06 NOTE — TELEPHONE ENCOUNTER
Rx Refill Note  Requested Prescriptions     Pending Prescriptions Disp Refills   • LORazepam (ATIVAN) 2 MG tablet 60 tablet 2     Sig: Take 1 tablet by mouth 2 (Two) Times a Day As Needed for Anxiety.      Last office visit with prescribing clinician: 3/28/2022      Next office visit with prescribing clinician: 9/16/2022            Cecily Grullon MA  04/06/22, 07:16 EDT

## 2022-04-25 ENCOUNTER — TELEPHONE (OUTPATIENT)
Dept: SURGERY | Facility: CLINIC | Age: 62
End: 2022-04-25

## 2022-04-25 NOTE — TELEPHONE ENCOUNTER
Called patient to reschedule cancelled appointment on 4/4/2022.  Patient states he is better now and feels he does not need to come in at this time.   Will notify referring provider.

## 2022-06-30 DIAGNOSIS — G40.909 SEIZURE DISORDER: ICD-10-CM

## 2022-07-01 RX ORDER — LORAZEPAM 2 MG/1
2 TABLET ORAL 2 TIMES DAILY PRN
Qty: 60 TABLET | Refills: 2 | Status: SHIPPED | OUTPATIENT
Start: 2022-07-01 | End: 2022-09-22 | Stop reason: SDUPTHER

## 2022-08-18 ENCOUNTER — TELEMEDICINE (OUTPATIENT)
Dept: FAMILY MEDICINE CLINIC | Facility: TELEHEALTH | Age: 62
End: 2022-08-18

## 2022-08-18 DIAGNOSIS — R12 HEARTBURN: Primary | ICD-10-CM

## 2022-08-18 PROCEDURE — 99213 OFFICE O/P EST LOW 20 MIN: CPT | Performed by: NURSE PRACTITIONER

## 2022-08-18 RX ORDER — FAMOTIDINE 20 MG/1
20 TABLET, FILM COATED ORAL 2 TIMES DAILY PRN
Qty: 30 TABLET | Refills: 0 | Status: SHIPPED | OUTPATIENT
Start: 2022-08-18

## 2022-08-18 RX ORDER — OMEPRAZOLE 40 MG/1
40 CAPSULE, DELAYED RELEASE ORAL DAILY
Qty: 30 CAPSULE | Refills: 0 | Status: SHIPPED | OUTPATIENT
Start: 2022-08-18 | End: 2022-09-17

## 2022-08-19 NOTE — PROGRESS NOTES
"HPI  Jonas Terrazas is a 61 y.o. male  presents with complaint of ongoing heartburn for \"forever\". Usually happened only after greasy/spicy foods but now is occurring more. Has taken omeprazole 20mg (would take for 14 days at a time due to OTC instructions, but he has not had in a few days) and famotidine 20mg (as needed when he eats spicy foods) in the past but nothing prescribed. Over the last week when he lies down at bedtime he has been having worsening heartburn/acid up into his throat. He then will wake up around 4am with heartburn. Has been eating snacks around 8pm but he is going to stop that today. Denies fever, chills, sweats, abd pain, N/V/D, arm pain, ekg normal per his home monitor, BP normal.     Review of Systems    Past Medical History:   Diagnosis Date   • BRBPR (bright red blood per rectum) 10/16/2018   • Diverticulitis    • GERD (gastroesophageal reflux disease)    • History of diverticulitis 5/1/2017   • Hyperlipidemia    • Hypertension    • Seizures (HCC)        Family History   Problem Relation Age of Onset   • Alzheimer's disease Mother    • Heart attack Father    • Hypertension Father        Social History     Socioeconomic History   • Marital status:    Tobacco Use   • Smoking status: Never Smoker   • Smokeless tobacco: Never Used   Substance and Sexual Activity   • Alcohol use: No   • Drug use: No         There were no vitals taken for this visit.    PHYSICAL EXAM  Physical Exam   Constitutional: He appears well-developed and well-nourished.   HENT:   Head: Normocephalic.   Nose: Nose normal.   Neck: Neck normal appearance.  Pulmonary/Chest: Effort normal.   Neurological: He is alert.   Psychiatric: He has a normal mood and affect. His speech is normal.       Diagnoses and all orders for this visit:    1. Heartburn (Primary)  -     omeprazole (priLOSEC) 40 MG capsule; Take 1 capsule by mouth Daily for 30 days.  Dispense: 30 capsule; Refill: 0  -     famotidine (Pepcid) 20 MG tablet; " Take 1 tablet by mouth 2 (Two) Times a Day As Needed for Heartburn.  Dispense: 30 tablet; Refill: 0      *Will start with omeprazole 40mg daily. Pepcid for breakthrough heartburn. Keep appt with PCP in one month, or change to sooner appt if symptoms do not improve. Avoid eating after 6pm, avoid caffeine/carbonation in evenings (all day if possible), elevate head at bedtime. Avoid trigger foods (spicy, greasy foods). Pt verbalized understanding.     FOLLOW-UP  As discussed during visit with [facility], if symptoms worsen or fail to improve, follow-up with PCP/Urgent Care/Emergency Department.    Patient verbalizes understanding of medications, instructions for treatment and follow-up.    Letitia Lombardi, APRN  08/18/2022  20:00 EDT    The use of a video visit has been reviewed with the patient and verbal informed consent has been obtained. Myself and Jonas Terrazas participated in this visit. The patient is located in [place], [place], and I am located in [place], [place]. MyChart and Zoom were utilized.

## 2022-09-20 ENCOUNTER — OFFICE VISIT (OUTPATIENT)
Dept: FAMILY MEDICINE CLINIC | Facility: CLINIC | Age: 62
End: 2022-09-20

## 2022-09-20 VITALS
SYSTOLIC BLOOD PRESSURE: 180 MMHG | HEIGHT: 66 IN | BODY MASS INDEX: 24.88 KG/M2 | WEIGHT: 154.8 LBS | HEART RATE: 101 BPM | DIASTOLIC BLOOD PRESSURE: 98 MMHG | OXYGEN SATURATION: 97 % | TEMPERATURE: 97.1 F

## 2022-09-20 DIAGNOSIS — G89.4 CHRONIC PAIN SYNDROME: ICD-10-CM

## 2022-09-20 DIAGNOSIS — E55.9 VITAMIN D DEFICIENCY: ICD-10-CM

## 2022-09-20 DIAGNOSIS — Z87.828 HISTORY OF SPINAL CORD INJURY: ICD-10-CM

## 2022-09-20 DIAGNOSIS — E78.2 MIXED HYPERLIPIDEMIA: ICD-10-CM

## 2022-09-20 DIAGNOSIS — I10 ESSENTIAL HYPERTENSION: ICD-10-CM

## 2022-09-20 DIAGNOSIS — F41.1 GAD (GENERALIZED ANXIETY DISORDER): ICD-10-CM

## 2022-09-20 DIAGNOSIS — Z00.00 MEDICARE ANNUAL WELLNESS VISIT, SUBSEQUENT: Primary | ICD-10-CM

## 2022-09-20 DIAGNOSIS — K21.9 GASTROESOPHAGEAL REFLUX DISEASE WITHOUT ESOPHAGITIS: ICD-10-CM

## 2022-09-20 DIAGNOSIS — G40.109 AUTONOMIC EPILEPTIC SEIZURES: ICD-10-CM

## 2022-09-20 PROCEDURE — 1125F AMNT PAIN NOTED PAIN PRSNT: CPT | Performed by: NURSE PRACTITIONER

## 2022-09-20 PROCEDURE — G0439 PPPS, SUBSEQ VISIT: HCPCS | Performed by: NURSE PRACTITIONER

## 2022-09-20 PROCEDURE — 1170F FXNL STATUS ASSESSED: CPT | Performed by: NURSE PRACTITIONER

## 2022-09-20 PROCEDURE — 96160 PT-FOCUSED HLTH RISK ASSMT: CPT | Performed by: NURSE PRACTITIONER

## 2022-09-20 PROCEDURE — 1160F RVW MEDS BY RX/DR IN RCRD: CPT | Performed by: NURSE PRACTITIONER

## 2022-09-20 RX ORDER — MULTIPLE VITAMINS W/ MINERALS TAB 9MG-400MCG
1 TAB ORAL DAILY
COMMUNITY

## 2022-09-20 RX ORDER — LISINOPRIL 40 MG/1
20 TABLET ORAL DAILY
Qty: 90 TABLET | Refills: 1 | Status: SHIPPED | OUTPATIENT
Start: 2022-09-20 | End: 2022-09-20

## 2022-09-20 RX ORDER — LISINOPRIL 40 MG/1
40 TABLET ORAL DAILY
Qty: 90 TABLET | Refills: 1 | Status: SHIPPED | OUTPATIENT
Start: 2022-09-20

## 2022-09-20 RX ORDER — IBUPROFEN 200 MG
TABLET ORAL
COMMUNITY
Start: 2022-03-30

## 2022-09-21 LAB
25(OH)D3+25(OH)D2 SERPL-MCNC: 27.5 NG/ML (ref 30–100)
ALBUMIN SERPL-MCNC: 4.6 G/DL (ref 3.8–4.8)
ALBUMIN/GLOB SERPL: 1.5 {RATIO} (ref 1.2–2.2)
ALP SERPL-CCNC: 75 IU/L (ref 44–121)
ALT SERPL-CCNC: 15 IU/L (ref 0–44)
AST SERPL-CCNC: 24 IU/L (ref 0–40)
BASOPHILS # BLD AUTO: 0.1 X10E3/UL (ref 0–0.2)
BASOPHILS NFR BLD AUTO: 1 %
BILIRUB SERPL-MCNC: 0.2 MG/DL (ref 0–1.2)
BUN SERPL-MCNC: 11 MG/DL (ref 8–27)
BUN/CREAT SERPL: 11 (ref 10–24)
CALCIUM SERPL-MCNC: 9.4 MG/DL (ref 8.6–10.2)
CHLORIDE SERPL-SCNC: 101 MMOL/L (ref 96–106)
CHOLEST SERPL-MCNC: 230 MG/DL (ref 100–199)
CO2 SERPL-SCNC: 25 MMOL/L (ref 20–29)
CREAT SERPL-MCNC: 0.97 MG/DL (ref 0.76–1.27)
EGFRCR SERPLBLD CKD-EPI 2021: 89 ML/MIN/1.73
EOSINOPHIL # BLD AUTO: 0.1 X10E3/UL (ref 0–0.4)
EOSINOPHIL NFR BLD AUTO: 2 %
ERYTHROCYTE [DISTWIDTH] IN BLOOD BY AUTOMATED COUNT: 13.1 % (ref 11.6–15.4)
GLOBULIN SER CALC-MCNC: 3 G/DL (ref 1.5–4.5)
GLUCOSE SERPL-MCNC: 102 MG/DL (ref 65–99)
HCT VFR BLD AUTO: 40.9 % (ref 37.5–51)
HDLC SERPL-MCNC: 32 MG/DL
HGB BLD-MCNC: 13.5 G/DL (ref 13–17.7)
IMM GRANULOCYTES # BLD AUTO: 0 X10E3/UL (ref 0–0.1)
IMM GRANULOCYTES NFR BLD AUTO: 0 %
LDLC SERPL CALC-MCNC: 142 MG/DL (ref 0–99)
LYMPHOCYTES # BLD AUTO: 2.1 X10E3/UL (ref 0.7–3.1)
LYMPHOCYTES NFR BLD AUTO: 28 %
MCH RBC QN AUTO: 31.1 PG (ref 26.6–33)
MCHC RBC AUTO-ENTMCNC: 33 G/DL (ref 31.5–35.7)
MCV RBC AUTO: 94 FL (ref 79–97)
MONOCYTES # BLD AUTO: 0.5 X10E3/UL (ref 0.1–0.9)
MONOCYTES NFR BLD AUTO: 6 %
NEUTROPHILS # BLD AUTO: 5 X10E3/UL (ref 1.4–7)
NEUTROPHILS NFR BLD AUTO: 63 %
PLATELET # BLD AUTO: 338 X10E3/UL (ref 150–450)
POTASSIUM SERPL-SCNC: 4 MMOL/L (ref 3.5–5.2)
PROT SERPL-MCNC: 7.6 G/DL (ref 6–8.5)
RBC # BLD AUTO: 4.34 X10E6/UL (ref 4.14–5.8)
SODIUM SERPL-SCNC: 138 MMOL/L (ref 134–144)
TRIGL SERPL-MCNC: 306 MG/DL (ref 0–149)
VLDLC SERPL CALC-MCNC: 56 MG/DL (ref 5–40)
WBC # BLD AUTO: 7.8 X10E3/UL (ref 3.4–10.8)

## 2022-09-22 DIAGNOSIS — G40.909 SEIZURE DISORDER: ICD-10-CM

## 2022-09-22 NOTE — TELEPHONE ENCOUNTER
Rx Refill Note  Requested Prescriptions     Pending Prescriptions Disp Refills   • LORazepam (ATIVAN) 2 MG tablet 60 tablet 2     Sig: Take 1 tablet by mouth 2 (Two) Times a Day As Needed for Anxiety.      Last office visit with prescribing clinician: 9/20/2022      Next office visit with prescribing clinician: 3/20/2023            Rosie West MA  09/22/22, 11:47 EDT

## 2022-09-23 RX ORDER — LORAZEPAM 2 MG/1
2 TABLET ORAL 2 TIMES DAILY PRN
Qty: 60 TABLET | Refills: 2 | Status: SHIPPED | OUTPATIENT
Start: 2022-09-23 | End: 2022-12-16 | Stop reason: SDUPTHER

## 2022-09-23 NOTE — PROGRESS NOTES
Jonas,  Your labs show elevated triglycerides but improvement in your bad cholesterol.  This is partially probably because you were not fasting.  Continue to work on good food choices and remaining active as well as you can with in your physical limitations, vitamin D was low.  If you not currently taking vitamin D I recommend 2000 IU vitamin D3 daily.  Can get this over-the-counter.  Thanks,  Taylor

## 2022-12-16 DIAGNOSIS — G40.909 SEIZURE DISORDER: ICD-10-CM

## 2022-12-16 RX ORDER — LORAZEPAM 2 MG/1
2 TABLET ORAL 2 TIMES DAILY PRN
Qty: 60 TABLET | Refills: 2 | Status: SHIPPED | OUTPATIENT
Start: 2022-12-16 | End: 2023-03-08 | Stop reason: SDUPTHER

## 2022-12-16 NOTE — TELEPHONE ENCOUNTER
Rx Refill Note  Requested Prescriptions     Pending Prescriptions Disp Refills   • LORazepam (ATIVAN) 2 MG tablet 60 tablet 2     Sig: Take 1 tablet by mouth 2 (Two) Times a Day As Needed for Anxiety.      Last office visit with prescribing clinician: 9/20/2022   Last telemedicine visit with prescribing clinician: 3/20/2023   Next office visit with prescribing clinician: 3/20/2023                         Would you like a call back once the refill request has been completed: [] Yes [] No    If the office needs to give you a call back, can they leave a voicemail: [] Yes [] No    Manisha Segura MA  12/16/22, 12:32 EST

## 2023-03-08 DIAGNOSIS — G40.909 SEIZURE DISORDER: ICD-10-CM

## 2023-03-08 RX ORDER — LORAZEPAM 2 MG/1
2 TABLET ORAL 2 TIMES DAILY PRN
Qty: 60 TABLET | Refills: 2 | Status: SHIPPED | OUTPATIENT
Start: 2023-03-08

## 2023-03-20 ENCOUNTER — OFFICE VISIT (OUTPATIENT)
Dept: FAMILY MEDICINE CLINIC | Facility: CLINIC | Age: 63
End: 2023-03-20
Payer: MEDICARE

## 2023-03-20 VITALS
WEIGHT: 155.4 LBS | HEART RATE: 111 BPM | SYSTOLIC BLOOD PRESSURE: 160 MMHG | BODY MASS INDEX: 24.98 KG/M2 | TEMPERATURE: 97.9 F | HEIGHT: 66 IN | DIASTOLIC BLOOD PRESSURE: 100 MMHG | OXYGEN SATURATION: 98 %

## 2023-03-20 DIAGNOSIS — I10 ESSENTIAL HYPERTENSION: Primary | ICD-10-CM

## 2023-03-20 DIAGNOSIS — G89.4 CHRONIC PAIN SYNDROME: ICD-10-CM

## 2023-03-20 DIAGNOSIS — Z87.828 HISTORY OF SPINAL CORD INJURY: ICD-10-CM

## 2023-03-20 DIAGNOSIS — Z51.81 ENCOUNTER FOR THERAPEUTIC DRUG MONITORING: ICD-10-CM

## 2023-03-20 DIAGNOSIS — E78.2 MIXED HYPERLIPIDEMIA: ICD-10-CM

## 2023-03-20 DIAGNOSIS — G40.109 AUTONOMIC EPILEPTIC SEIZURES: ICD-10-CM

## 2023-03-20 DIAGNOSIS — F41.1 GAD (GENERALIZED ANXIETY DISORDER): ICD-10-CM

## 2023-03-20 DIAGNOSIS — K21.9 GASTROESOPHAGEAL REFLUX DISEASE WITHOUT ESOPHAGITIS: ICD-10-CM

## 2023-03-20 DIAGNOSIS — G89.29 CHRONIC LEFT SHOULDER PAIN: ICD-10-CM

## 2023-03-20 DIAGNOSIS — M25.512 CHRONIC LEFT SHOULDER PAIN: ICD-10-CM

## 2023-03-20 PROCEDURE — 3077F SYST BP >= 140 MM HG: CPT | Performed by: NURSE PRACTITIONER

## 2023-03-20 PROCEDURE — 99214 OFFICE O/P EST MOD 30 MIN: CPT | Performed by: NURSE PRACTITIONER

## 2023-03-20 PROCEDURE — 1160F RVW MEDS BY RX/DR IN RCRD: CPT | Performed by: NURSE PRACTITIONER

## 2023-03-20 PROCEDURE — 3080F DIAST BP >= 90 MM HG: CPT | Performed by: NURSE PRACTITIONER

## 2023-03-20 PROCEDURE — 1159F MED LIST DOCD IN RCRD: CPT | Performed by: NURSE PRACTITIONER

## 2023-03-20 NOTE — PROGRESS NOTES
"      Subjective     Chief Complaint:    Chief Complaint   Patient presents with   • Hypertension   • Hyperlipidemia       History of Present Illness:       Jonas Terrazas is a 61 y.o. male who presents for a 6 month follow up for blood pressure.    Decreasing morphine pump for line rhys   Has been seeing Dr. Hector for pain management. Is managing his pain pump. He had some issues with pump and Dr. Hector has been working with that. He is having some catheter issues.   He continues tramadol, and tylenol #3.   He has htn, takes lisinopril. Historically worse when he comes here but has been has been elevated at home.   He has maintained his weight   Has hx of diverticulitits. Uses lomotil as needed. Uses Sparingly  Has hx of epileptic seizure and has not had any for several years. Ativan helps. Also helps anxiety.   GERD, doing better on daily PPI    Decreased ROM in left arm  Take ibuprofen otc   aspircreme topical lotion   \"feels like bursitis\"        Review of Systems  Gen- No fevers, chills  CV- No chest pain, palpitations  Resp- No cough, dyspnea  GI- No N/V/D, abd pain  Neuro-No dizziness, headaches      I have reviewed and/or updated the patient's past medical, surgical, family, social history and problem list as appropriate.     Medications:    Current Outpatient Medications:   •  acetaminophen-codeine (TYLENOL #3) 300-30 MG per tablet, Take 1 tablet by mouth Every 6 (Six) Hours As Needed for Moderate Pain., Disp: , Rfl:   •  diphenoxylate-atropine (LOMOTIL) 2.5-0.025 MG per tablet, Take 1 tablet by mouth 4 (Four) Times a Day As Needed for Diarrhea., Disp: 30 tablet, Rfl: 0  •  esomeprazole (nexIUM) 20 MG capsule, Take 1 capsule by mouth Every Morning Before Breakfast., Disp: , Rfl:   •  famotidine (Pepcid) 20 MG tablet, Take 1 tablet by mouth 2 (Two) Times a Day As Needed for Heartburn., Disp: 30 tablet, Rfl: 0  •  ibuprofen (ADVIL,MOTRIN) 200 MG tablet, , Disp: , Rfl:   •  lisinopril (PRINIVIL,ZESTRIL) " "40 MG tablet, Take 1 tablet by mouth Daily., Disp: 90 tablet, Rfl: 1  •  LORazepam (ATIVAN) 2 MG tablet, Take 1 tablet by mouth 2 (Two) Times a Day As Needed for Anxiety., Disp: 60 tablet, Rfl: 2  •  Morphine 1 MG/ML solution, Infuse  into a venous catheter Continuous., Disp: , Rfl:   •  multivitamin with minerals tablet tablet, Take 1 tablet by mouth Daily., Disp: , Rfl:   •  NARCAN 4 MG/0.1ML nasal spray, USE AS NEEDED -GO TO THE ER, Disp: , Rfl: 0  •  traMADol (ULTRAM) 50 MG tablet, TAKE ONE TABLET BY MOUTH THREE TIMES A DAY, Disp: , Rfl: 0    Allergies:  No Known Allergies    Objective     Vital Signs:   Vitals:    03/20/23 1438   BP: 160/100   Pulse: 111   Temp: 97.9 °F (36.6 °C)   SpO2: 98%   Weight: 70.5 kg (155 lb 6.4 oz)   Height: 167.7 cm (66.02\")   PainSc:   3     Body mass index is 25.06 kg/m².    Physical Exam:    Physical Exam  Vitals and nursing note reviewed.   Constitutional:       Appearance: Normal appearance. He is well-developed.   HENT:      Head: Normocephalic and atraumatic.   Eyes:      Pupils: Pupils are equal, round, and reactive to light.   Cardiovascular:      Rate and Rhythm: Regular rhythm. Tachycardia present.      Heart sounds: Normal heart sounds.   Pulmonary:      Effort: Pulmonary effort is normal.      Breath sounds: Normal breath sounds.   Abdominal:      General: Bowel sounds are normal. There is no distension.      Palpations: Abdomen is soft.      Tenderness: There is no abdominal tenderness.   Musculoskeletal:      Left shoulder: Tenderness present. Decreased range of motion.      Cervical back: Neck supple.   Skin:     General: Skin is warm and dry.      Capillary Refill: Capillary refill takes less than 2 seconds.   Neurological:      General: No focal deficit present.      Mental Status: He is alert and oriented to person, place, and time.   Psychiatric:         Mood and Affect: Mood normal.         Behavior: Behavior normal.         Assessment / Plan     Assessment/Plan: "   Problem List Items Addressed This Visit        Cardiac and Vasculature    Essential hypertension - Primary    Relevant Medications    lisinopril (PRINIVIL,ZESTRIL) 40 MG tablet    Mixed hyperlipidemia       Gastrointestinal Abdominal     Gastroesophageal reflux disease without esophagitis    Relevant Medications    famotidine (Pepcid) 20 MG tablet    esomeprazole (nexIUM) 20 MG capsule       Mental Health    BRIAN (generalized anxiety disorder)    Relevant Medications    LORazepam (ATIVAN) 2 MG tablet       Neuro    Autonomic epileptic seizures (HCC)    Chronic pain syndrome    History of spinal cord injury   Other Visit Diagnoses     Chronic left shoulder pain        Relevant Orders    XR Shoulder 2+ View Left        -- uncontrolled but he feels is more consistent with white coat symdrome, will get a new cuff and monitor at home  -- continue ative  -- check shoulder xray, declines PT  - Avoid eating spicy foods, Avoid caffeinated beverages, Avoid carbonated beverage, Do not eat or drink within 2-3 hours of going to bed in the evening, Elevate head of bed on 4-6 inch blocks, Eat smaller portions of food throughout the day  -- continue pain management       Discussed plan of care in detail with pt today; pt verb understanding and agrees.    Follow up:  6 months    ITaylor, personally performed the services described in this documentation, as scribed by Marisa Simmons in my presence, and is both accurate and complete      Electronically signed by DAVID Dobson   03/20/2023 14:44 EDT      Please note that portions of this note were completed with a voice recognition program.

## 2023-03-24 ENCOUNTER — HOSPITAL ENCOUNTER (OUTPATIENT)
Dept: GENERAL RADIOLOGY | Facility: HOSPITAL | Age: 63
Discharge: HOME | End: 2023-03-24
Admitting: NURSE PRACTITIONER
Payer: MEDICARE

## 2023-03-24 DIAGNOSIS — M25.512 CHRONIC LEFT SHOULDER PAIN: ICD-10-CM

## 2023-03-24 DIAGNOSIS — G89.29 CHRONIC LEFT SHOULDER PAIN: ICD-10-CM

## 2023-03-24 PROCEDURE — 73030 X-RAY EXAM OF SHOULDER: CPT

## 2023-03-26 LAB — DRUGS UR: NORMAL

## 2023-04-18 ENCOUNTER — TELEPHONE (OUTPATIENT)
Dept: FAMILY MEDICINE CLINIC | Facility: CLINIC | Age: 63
End: 2023-04-18

## 2023-04-18 NOTE — TELEPHONE ENCOUNTER
Caller: Jonas Terrazas    Relationship: Self    Best call back number: [phone]    What is the best time to reach you: ANY    Who are you requesting to speak with (clinical staff, provider,  specific staff member): URIEL     What was the call regarding: JONAS SAID HER DROPPED OF [employer] EXEMPTION FORMS [phone] AND NEEDS IT FILLED OUT ASAP.  HE NEEDS THIS IS 5 DAYS FROM RECEIPT [id number], NEEDS BACK ASAP    Do you require a callback: YES

## 2023-05-15 DIAGNOSIS — I10 ESSENTIAL HYPERTENSION: ICD-10-CM

## 2023-05-16 RX ORDER — LISINOPRIL 40 MG/1
TABLET ORAL
Qty: 90 TABLET | Refills: 1 | Status: SHIPPED | OUTPATIENT
Start: 2023-05-16

## 2023-06-08 DIAGNOSIS — G40.909 SEIZURE DISORDER: ICD-10-CM

## 2023-06-08 NOTE — TELEPHONE ENCOUNTER
Rx Refill Note  Requested Prescriptions     Pending Prescriptions Disp Refills    LORazepam (ATIVAN) 2 MG tablet 60 tablet 2     Sig: Take 1 tablet by mouth 2 (Two) Times a Day As Needed for Anxiety.      Last office visit with prescribing clinician: 3/20/2023   Last telemedicine visit with prescribing clinician: Visit date not found   Next office visit with prescribing clinician: 9/22/2023                         Would you like a call back once the refill request has been completed: [] Yes [] No    If the office needs to give you a call back, can they leave a voicemail: [] Yes [] No    Ara Alonzo MA  06/08/23, 13:21 EDT

## 2023-06-09 RX ORDER — LORAZEPAM 2 MG/1
2 TABLET ORAL 2 TIMES DAILY PRN
Qty: 60 TABLET | Refills: 2 | Status: SHIPPED | OUTPATIENT
Start: 2023-06-09

## 2023-08-30 DIAGNOSIS — G40.909 SEIZURE DISORDER: ICD-10-CM

## 2023-08-31 NOTE — TELEPHONE ENCOUNTER
Rx Refill Note  Requested Prescriptions     Pending Prescriptions Disp Refills    LORazepam (ATIVAN) 2 MG tablet 60 tablet 2     Sig: Take 1 tablet by mouth 2 (Two) Times a Day As Needed for Anxiety.      Last office visit with prescribing clinician: 3/20/2023   Last telemedicine visit with prescribing clinician: Visit date not found   Next office visit with prescribing clinician: 9/22/2023                         Would you like a call back once the refill request has been completed: [] Yes [] No    If the office needs to give you a call back, can they leave a voicemail: [] Yes [] No    Ara Alonzo MA  08/31/23, 17:34 EDT

## 2023-09-01 RX ORDER — LORAZEPAM 2 MG/1
2 TABLET ORAL 2 TIMES DAILY PRN
Qty: 60 TABLET | Refills: 2 | Status: SHIPPED | OUTPATIENT
Start: 2023-09-01

## 2023-09-22 ENCOUNTER — OFFICE VISIT (OUTPATIENT)
Dept: FAMILY MEDICINE CLINIC | Facility: CLINIC | Age: 63
End: 2023-09-22

## 2023-09-22 VITALS
SYSTOLIC BLOOD PRESSURE: 150 MMHG | OXYGEN SATURATION: 96 % | BODY MASS INDEX: 24.68 KG/M2 | DIASTOLIC BLOOD PRESSURE: 90 MMHG | WEIGHT: 153.6 LBS | HEART RATE: 84 BPM | HEIGHT: 66 IN

## 2023-09-22 DIAGNOSIS — G89.4 CHRONIC PAIN SYNDROME: ICD-10-CM

## 2023-09-22 DIAGNOSIS — E78.2 MIXED HYPERLIPIDEMIA: ICD-10-CM

## 2023-09-22 DIAGNOSIS — K21.9 GASTROESOPHAGEAL REFLUX DISEASE WITHOUT ESOPHAGITIS: ICD-10-CM

## 2023-09-22 DIAGNOSIS — Z00.00 MEDICARE ANNUAL WELLNESS VISIT, SUBSEQUENT: Primary | ICD-10-CM

## 2023-09-22 DIAGNOSIS — G81.90 HEMIPARESIS, UNSPECIFIED HEMIPARESIS ETIOLOGY, UNSPECIFIED LATERALITY: ICD-10-CM

## 2023-09-22 DIAGNOSIS — I10 ESSENTIAL HYPERTENSION: ICD-10-CM

## 2023-09-22 DIAGNOSIS — Z87.828 HISTORY OF SPINAL CORD INJURY: ICD-10-CM

## 2023-09-22 DIAGNOSIS — G40.909 SEIZURE DISORDER: ICD-10-CM

## 2023-09-22 DIAGNOSIS — F41.1 GAD (GENERALIZED ANXIETY DISORDER): ICD-10-CM

## 2023-09-22 NOTE — PROGRESS NOTES
The ABCs of the Annual Wellness Visit  Subsequent Medicare Wellness Visit    Subjective    Jonas Terrazas is a 62 y.o. male who presents for a Subsequent Medicare Wellness Visit.    The following portions of the patient's history were reviewed and   updated as appropriate: allergies, current medications, past family history, past medical history, past social history, past surgical history, and problem list.    Compared to one year ago, the patient feels his physical   health is the same.    Compared to one year ago, the patient feels his mental   health is the same.    Recent Hospitalizations:  He was not admitted to the hospital during the last year.       Current Medical Providers:  Patient Care Team:  Taylor Rose APRN as PCP - General (Nurse Practitioner)    Outpatient Medications Prior to Visit   Medication Sig Dispense Refill   • acetaminophen-codeine (TYLENOL #3) 300-30 MG per tablet Take 1 tablet by mouth Every 6 (Six) Hours As Needed for Moderate Pain.     • diphenoxylate-atropine (LOMOTIL) 2.5-0.025 MG per tablet Take 1 tablet by mouth 4 (Four) Times a Day As Needed for Diarrhea. 30 tablet 0   • esomeprazole (nexIUM) 20 MG capsule Take 1 capsule by mouth Every Morning Before Breakfast.     • famotidine (Pepcid) 20 MG tablet Take 1 tablet by mouth 2 (Two) Times a Day As Needed for Heartburn. 30 tablet 0   • ibuprofen (ADVIL,MOTRIN) 200 MG tablet      • lisinopril (PRINIVIL,ZESTRIL) 40 MG tablet TAKE 1 TABLET BY MOUTH EVERY DAY 90 tablet 1   • LORazepam (ATIVAN) 2 MG tablet Take 1 tablet by mouth 2 (Two) Times a Day As Needed for Anxiety. 60 tablet 2   • Morphine 1 MG/ML solution Infuse  into a venous catheter Continuous.     • multivitamin with minerals tablet tablet Take 1 tablet by mouth Daily.     • NARCAN 4 MG/0.1ML nasal spray USE AS NEEDED -GO TO THE ER  0   • traMADol (ULTRAM) 50 MG tablet TAKE ONE TABLET BY MOUTH THREE TIMES A DAY  0     No facility-administered medications prior to visit.  "      Opioid medication/s are on active medication list.  and I have evaluated his active treatment plan and pain score trends (see table).  There were no vitals filed for this visit.  I have reviewed the chart for potential of high risk medication and harmful drug interactions in the elderly.          Aspirin is not on active medication list.  Aspirin use is indicated based on review of current medical condition/s. Pros and cons of this therapy have been discussed with this patient. Benefits of this medication outweigh potential harm.  Patient has been instructed to start taking this medication..    Patient Active Problem List   Diagnosis   • Essential hypertension   • Gastroesophageal reflux disease without esophagitis   • Hemiparesis   • Mixed hyperlipidemia   • Autonomic epileptic seizures   • Chronic pain syndrome   • Vasovagal syncope   • Colitis   • History of spinal cord injury   • Macrocytic anemia with vitamin B12 deficiency   • BRIAN (generalized anxiety disorder)     Advance Care Planning   Advance Care Planning     Advance Directive is not on file.  ACP discussion was held with the patient during this visit. Patient does not have an advance directive, information provided.     Objective    Vitals:    09/22/23 1646   BP: 150/90   Pulse: 84   SpO2: 96%   Weight: 69.7 kg (153 lb 9.6 oz)   Height: 167.6 cm (66\")     Estimated body mass index is 24.79 kg/m² as calculated from the following:    Height as of this encounter: 167.6 cm (66\").    Weight as of this encounter: 69.7 kg (153 lb 9.6 oz).    BMI is within normal parameters. No other follow-up for BMI required.      Does the patient have evidence of cognitive impairment? No          HEALTH RISK ASSESSMENT    Smoking Status:  Social History     Tobacco Use   Smoking Status Never   Smokeless Tobacco Never     Alcohol Consumption:  Social History     Substance and Sexual Activity   Alcohol Use Yes   • Alcohol/week: 1.0 standard drink   • Types: 1 Glasses of " wine per week    Comment: Once or twice a week - Red wine with dinner.     Fall Risk Screen:    TANIYA Fall Risk Assessment was completed, and patient is at MODERATE risk for falls. Assessment completed on:2023    Depression Screenin/22/2023     4:42 PM   PHQ-2/PHQ-9 Depression Screening   Little Interest or Pleasure in Doing Things 0-->not at all   Feeling Down, Depressed or Hopeless 0-->not at all   PHQ-9: Brief Depression Severity Measure Score 0       Health Habits and Functional and Cognitive Screenin/22/2023     4:42 PM   Functional & Cognitive Status   Do you have difficulty preparing food and eating? No   Do you have difficulty bathing yourself, getting dressed or grooming yourself? No   Do you have difficulty using the toilet? No   Do you have difficulty moving around from place to place? No   Do you have trouble with steps or getting out of a bed or a chair? No   Current Diet Well Balanced Diet   Dental Exam Up to date   Eye Exam Up to date   Exercise (times per week) Other   Current Exercises Include No Regular Exercise   Do you need help using the phone?  No   Are you deaf or do you have serious difficulty hearing?  Yes   Do you need help to go to places out of walking distance? No   Do you need help shopping? No   Do you need help preparing meals?  No   Do you need help with housework?  No   Do you need help with laundry? No   Do you need help taking your medications? No   Do you need help managing money? No   Do you ever drive or ride in a car without wearing a seat belt? No       Age-appropriate Screening Schedule:  Refer to the list below for future screening recommendations based on patient's age, sex and/or medical conditions. Orders for these recommended tests are listed in the plan section. The patient has been provided with a written plan.    Health Maintenance   Topic Date Due   • TDAP/TD VACCINES (2 - Td or Tdap) 2022   • LIPID PANEL  2023   • INFLUENZA  "VACCINE  10/01/2023   • ANNUAL WELLNESS VISIT  09/22/2024   • COLORECTAL CANCER SCREENING  05/12/2025   • HEPATITIS C SCREENING  Completed   • COVID-19 Vaccine  Completed   • Pneumococcal Vaccine 0-64  Aged Out   • ZOSTER VACCINE  Discontinued                  CMS Preventative Services Quick Reference  Risk Factors Identified During Encounter  None Identified  The above risks/problems have been discussed with the patient.  Pertinent information has been shared with the patient in the After Visit Summary.  An After Visit Summary and PPPS were made available to the patient.    Follow Up:   Next Medicare Wellness visit to be scheduled in 1 year.       Additional E&M Note during same encounter follows:  Patient has multiple medical problems which are significant and separately identifiable that require additional work above and beyond the Medicare Wellness Visit.      Chief Complaint  Medicare Wellness-subsequent and Hypertension    Subjective        Hypertension    Jonas BUCHANAN Lorijohnny is also being seen today  Has been seeing Dr. Hector for pain management. Is managing his pain pump.   He continues tramadol, and tylenol #3.   He has htn, takes lisinopril. home readings normal   Has hx of diverticulitits. Uses lomotil as needed. Uses Sparingly  Has hx of epileptic seizure and has not had any for several years. Ativan helps. Also helps anxiety.   GERD, doing better on daily PPI  Hld, diet controlled  Left Shoulder pain, seems better, has been doing home exercises           Objective   Vital Signs:  /90   Pulse 84   Ht 167.6 cm (66\")   Wt 69.7 kg (153 lb 9.6 oz)   SpO2 96%   BMI 24.79 kg/m²     Physical Exam  Constitutional:       Appearance: Normal appearance. He is well-developed.   HENT:      Head: Normocephalic and atraumatic.      Right Ear: Tympanic membrane, ear canal and external ear normal.      Left Ear: Tympanic membrane, ear canal and external ear normal.      Nose: Nose normal.      Mouth/Throat:      " Pharynx: Uvula midline.   Eyes:      Pupils: Pupils are equal, round, and reactive to light.   Cardiovascular:      Rate and Rhythm: Normal rate and regular rhythm.      Heart sounds: Normal heart sounds. No murmur heard.    No friction rub. No gallop.   Pulmonary:      Effort: Pulmonary effort is normal.      Breath sounds: Normal breath sounds.   Abdominal:      General: Bowel sounds are normal.      Palpations: Abdomen is soft.      Tenderness: There is no abdominal tenderness.   Musculoskeletal:      Cervical back: Neck supple.   Lymphadenopathy:      Head:      Right side of head: No submental, submandibular, tonsillar, preauricular or posterior auricular adenopathy.      Left side of head: No submental, submandibular, tonsillar, preauricular or posterior auricular adenopathy.      Cervical: No cervical adenopathy.   Skin:     General: Skin is warm and dry.   Neurological:      General: No focal deficit present.      Mental Status: He is alert and oriented to person, place, and time.   Psychiatric:         Mood and Affect: Mood normal.         Behavior: Behavior normal.                       Assessment and Plan   Diagnoses and all orders for this visit:    1. Medicare annual wellness visit, subsequent (Primary)    2. Seizure disorder    3. Essential hypertension    4. Gastroesophageal reflux disease without esophagitis    5. Mixed hyperlipidemia  -     CBC Auto Differential  -     Comprehensive Metabolic Panel  -     Lipid Panel    6. Chronic pain syndrome    7. History of spinal cord injury    8. Hemiparesis, unspecified hemiparesis etiology, unspecified laterality    9. BRIAN (generalized anxiety disorder)        -- wellness visit performed, avoid prolonged fasting periods, ensure good hydration, stay active yearly eye exam  -- BP stable at home, some weight coat HTN  -- labs  -- continue ativan  -- continue f/u with pain management  -- continue shoulder home exercises    Controlled Substance  Prescription  As  part of this patient's treatment plan I am prescribing controlled substances. The patient has been made aware of appropriate use of such medications, including potential risk of somnolence, limited ability to drive and /or work safely, and potential for dependence or overdose. It has also been made clear that these medications are for use by this patient only, without concomitant use of alcohol or other substances unless prescribed. Marco A reviewed and appropriate            Follow Up   No follow-ups on file.  Patient was given instructions and counseling regarding his condition or for health maintenance advice. Please see specific information pulled into the AVS if appropriate.

## 2023-11-10 DIAGNOSIS — I10 ESSENTIAL HYPERTENSION: ICD-10-CM

## 2023-11-10 RX ORDER — LISINOPRIL 40 MG/1
40 TABLET ORAL DAILY
Qty: 90 TABLET | Refills: 1 | Status: SHIPPED | OUTPATIENT
Start: 2023-11-10

## 2023-11-27 DIAGNOSIS — G40.909 SEIZURE DISORDER: ICD-10-CM

## 2023-11-28 RX ORDER — LORAZEPAM 2 MG/1
2 TABLET ORAL 2 TIMES DAILY PRN
Qty: 60 TABLET | Refills: 2 | Status: SHIPPED | OUTPATIENT
Start: 2023-11-28

## 2023-11-28 NOTE — TELEPHONE ENCOUNTER
Rx Refill Note  Requested Prescriptions     Pending Prescriptions Disp Refills    LORazepam (ATIVAN) 2 MG tablet 60 tablet 2     Sig: Take 1 tablet by mouth 2 (Two) Times a Day As Needed for Anxiety.      Last office visit with prescribing clinician: 9/22/2023   Last telemedicine visit with prescribing clinician: Visit date not found   Next office visit with prescribing clinician: 3/22/2024                         Would you like a call back once the refill request has been completed: [] Yes [] No    If the office needs to give you a call back, can they leave a voicemail: [] Yes [] No    Rosie Padron MA  11/28/23, 12:42 EST

## 2024-02-23 DIAGNOSIS — G40.909 SEIZURE DISORDER: ICD-10-CM

## 2024-02-26 RX ORDER — LORAZEPAM 2 MG/1
2 TABLET ORAL 2 TIMES DAILY PRN
Qty: 60 TABLET | Refills: 2 | Status: SHIPPED | OUTPATIENT
Start: 2024-02-26

## 2024-02-26 NOTE — TELEPHONE ENCOUNTER
Rx Refill Note  Requested Prescriptions     Pending Prescriptions Disp Refills    LORazepam (ATIVAN) 2 MG tablet 60 tablet 2     Sig: Take 1 tablet by mouth 2 (Two) Times a Day As Needed for Anxiety.      Last office visit with prescribing clinician: 9/22/2023   Last telemedicine visit with prescribing clinician: Visit date not found   Next office visit with prescribing clinician: 3/22/2024                         Would you like a call back once the refill request has been completed: [] Yes [] No    If the office needs to give you a call back, can they leave a voicemail: [] Yes [] No    Cecily Grullon MA  02/26/24, 10:17 EST

## 2024-03-22 ENCOUNTER — OFFICE VISIT (OUTPATIENT)
Dept: FAMILY MEDICINE CLINIC | Facility: CLINIC | Age: 64
End: 2024-03-22
Payer: MEDICARE

## 2024-03-22 VITALS
DIASTOLIC BLOOD PRESSURE: 100 MMHG | HEART RATE: 117 BPM | SYSTOLIC BLOOD PRESSURE: 160 MMHG | WEIGHT: 148 LBS | OXYGEN SATURATION: 98 % | BODY MASS INDEX: 23.78 KG/M2 | HEIGHT: 66 IN

## 2024-03-22 DIAGNOSIS — K21.9 GASTROESOPHAGEAL REFLUX DISEASE WITHOUT ESOPHAGITIS: ICD-10-CM

## 2024-03-22 DIAGNOSIS — G81.90 HEMIPARESIS, UNSPECIFIED HEMIPARESIS ETIOLOGY, UNSPECIFIED LATERALITY: ICD-10-CM

## 2024-03-22 DIAGNOSIS — D51.8 MACROCYTIC ANEMIA WITH VITAMIN B12 DEFICIENCY: ICD-10-CM

## 2024-03-22 DIAGNOSIS — E78.2 MIXED HYPERLIPIDEMIA: ICD-10-CM

## 2024-03-22 DIAGNOSIS — G40.109 AUTONOMIC EPILEPTIC SEIZURES: ICD-10-CM

## 2024-03-22 DIAGNOSIS — F41.1 GAD (GENERALIZED ANXIETY DISORDER): ICD-10-CM

## 2024-03-22 DIAGNOSIS — G89.4 CHRONIC PAIN SYNDROME: ICD-10-CM

## 2024-03-22 DIAGNOSIS — I10 ESSENTIAL HYPERTENSION: Primary | ICD-10-CM

## 2024-03-22 NOTE — PROGRESS NOTES
Subjective     Chief Complaint:    Chief Complaint   Patient presents with    Hypertension       History of Present Illness:   Has been seeing Dr. Hector for pain management. Is managing his pain pump, morphine.   He continues tramadol, and tylenol #3.   He has htn, takes lisinopril. home readings normal, has white coat syndrome  Has hx of diverticulitits. Uses lomotil as needed. Uses Sparingly  Has hx of epileptic seizure and has not had any for several years. Ativan helps. Also helps anxiety.   GERD, doing better on daily PPI  Hld, diet controlled  Left Shoulder pain, seems better, has been doing home exercises  Some tremoring in his leg muscles when he wakes up, rarely during the day         Review of Systems  Gen- No fevers, chills  CV- No chest pain, palpitations  Resp- No cough, dyspnea  GI- No N/V/D, abd pain  Neuro-No dizziness, headaches      I have reviewed and/or updated the patient's past medical, surgical, family, social history and problem list as appropriate.     Medications:    Current Outpatient Medications:     acetaminophen-codeine (TYLENOL #3) 300-30 MG per tablet, Take 1 tablet by mouth Every 6 (Six) Hours As Needed for Moderate Pain., Disp: , Rfl:     diphenoxylate-atropine (LOMOTIL) 2.5-0.025 MG per tablet, Take 1 tablet by mouth 4 (Four) Times a Day As Needed for Diarrhea., Disp: 30 tablet, Rfl: 0    esomeprazole (nexIUM) 20 MG capsule, Take 1 capsule by mouth Every Morning Before Breakfast., Disp: , Rfl:     famotidine (Pepcid) 20 MG tablet, Take 1 tablet by mouth 2 (Two) Times a Day As Needed for Heartburn., Disp: 30 tablet, Rfl: 0    ibuprofen (ADVIL,MOTRIN) 200 MG tablet, , Disp: , Rfl:     lisinopril (PRINIVIL,ZESTRIL) 40 MG tablet, Take 1 tablet by mouth Daily., Disp: 90 tablet, Rfl: 1    LORazepam (ATIVAN) 2 MG tablet, Take 1 tablet by mouth 2 (Two) Times a Day As Needed for Anxiety., Disp: 60 tablet, Rfl: 2    Morphine 1 MG/ML solution, Infuse  into a venous catheter Continuous.,  "Disp: , Rfl:     multivitamin with minerals tablet tablet, Take 1 tablet by mouth Daily., Disp: , Rfl:     NARCAN 4 MG/0.1ML nasal spray, USE AS NEEDED -GO TO THE ER, Disp: , Rfl: 0    traMADol (ULTRAM) 50 MG tablet, TAKE ONE TABLET BY MOUTH THREE TIMES A DAY, Disp: , Rfl: 0    Allergies:  No Known Allergies    Objective     Vital Signs:   Vitals:    03/22/24 1620   BP: 160/100   Pulse: 117   SpO2: 98%   Weight: 67.1 kg (148 lb)   Height: 167.6 cm (66\")     Body mass index is 23.89 kg/m².    Physical Exam:    Physical Exam  Vitals and nursing note reviewed.   Constitutional:       Appearance: He is well-developed.   HENT:      Head: Normocephalic and atraumatic.   Eyes:      Pupils: Pupils are equal, round, and reactive to light.   Cardiovascular:      Rate and Rhythm: Normal rate and regular rhythm.      Heart sounds: Normal heart sounds.   Pulmonary:      Effort: Pulmonary effort is normal.      Breath sounds: Normal breath sounds.   Abdominal:      General: Bowel sounds are normal. There is no distension.      Palpations: Abdomen is soft.      Tenderness: There is no abdominal tenderness.   Musculoskeletal:      Cervical back: Neck supple.   Skin:     General: Skin is warm and dry.   Neurological:      General: No focal deficit present.      Mental Status: He is alert and oriented to person, place, and time.   Psychiatric:         Mood and Affect: Mood normal.         Behavior: Behavior normal.         Assessment / Plan     Assessment/Plan:   Problem List Items Addressed This Visit       Essential hypertension - Primary    Relevant Medications    lisinopril (PRINIVIL,ZESTRIL) 40 MG tablet    Gastroesophageal reflux disease without esophagitis    Relevant Medications    famotidine (Pepcid) 20 MG tablet    esomeprazole (nexIUM) 20 MG capsule    Hemiparesis    Mixed hyperlipidemia    Relevant Orders    Comprehensive Metabolic Panel    CBC Auto Differential    Lipid Panel    Autonomic epileptic seizures    Chronic " pain syndrome    Macrocytic anemia with vitamin B12 deficiency    Relevant Orders    Vitamin B12    Folate    BRIAN (generalized anxiety disorder)    Relevant Medications    LORazepam (ATIVAN) 2 MG tablet       -- BP stable at home, continue current regimen  -- labs  -- continue ativan  -- continue f/u with pain management    Controlled Substance  Prescription  As part of this patient's treatment plan I am prescribing controlled substances. The patient has been made aware of appropriate use of such medications, including potential risk of somnolence, limited ability to drive and /or work safely, and potential for dependence or overdose. It has also been made clear that these medications are for use by this patient only, without concomitant use of alcohol or other substances unless prescribed. Marco A reviewed and appropriate     Discussed plan of care in detail with pt today; pt verb understanding and agrees.    Follow up:  6 months, medicare wellness    Electronically signed by DAVID Dobson   03/22/2024 16:41 EDT      Please note that portions of this note were completed with a voice recognition program.

## 2024-03-28 LAB
ALBUMIN SERPL-MCNC: 4.5 G/DL (ref 3.9–4.9)
ALBUMIN/GLOB SERPL: 1.7 {RATIO} (ref 1.2–2.2)
ALP SERPL-CCNC: 82 IU/L (ref 44–121)
ALT SERPL-CCNC: 11 IU/L (ref 0–44)
AST SERPL-CCNC: 21 IU/L (ref 0–40)
BASOPHILS # BLD AUTO: 0.1 X10E3/UL (ref 0–0.2)
BASOPHILS NFR BLD AUTO: 1 %
BILIRUB SERPL-MCNC: 0.3 MG/DL (ref 0–1.2)
BUN SERPL-MCNC: 9 MG/DL (ref 8–27)
BUN/CREAT SERPL: 9 (ref 10–24)
CALCIUM SERPL-MCNC: 9.1 MG/DL (ref 8.6–10.2)
CHLORIDE SERPL-SCNC: 102 MMOL/L (ref 96–106)
CHOLEST SERPL-MCNC: 202 MG/DL (ref 100–199)
CO2 SERPL-SCNC: 24 MMOL/L (ref 20–29)
CREAT SERPL-MCNC: 1.04 MG/DL (ref 0.76–1.27)
EGFRCR SERPLBLD CKD-EPI 2021: 81 ML/MIN/1.73
EOSINOPHIL # BLD AUTO: 0.2 X10E3/UL (ref 0–0.4)
EOSINOPHIL NFR BLD AUTO: 3 %
ERYTHROCYTE [DISTWIDTH] IN BLOOD BY AUTOMATED COUNT: 13.5 % (ref 11.6–15.4)
FOLATE SERPL-MCNC: 5.6 NG/ML
GLOBULIN SER CALC-MCNC: 2.6 G/DL (ref 1.5–4.5)
GLUCOSE SERPL-MCNC: 84 MG/DL (ref 70–99)
HCT VFR BLD AUTO: 38.9 % (ref 37.5–51)
HDLC SERPL-MCNC: 34 MG/DL
HGB BLD-MCNC: 13 G/DL (ref 13–17.7)
IMM GRANULOCYTES # BLD AUTO: 0 X10E3/UL (ref 0–0.1)
IMM GRANULOCYTES NFR BLD AUTO: 0 %
LDLC SERPL CALC-MCNC: 134 MG/DL (ref 0–99)
LYMPHOCYTES # BLD AUTO: 2.5 X10E3/UL (ref 0.7–3.1)
LYMPHOCYTES NFR BLD AUTO: 37 %
MCH RBC QN AUTO: 30.7 PG (ref 26.6–33)
MCHC RBC AUTO-ENTMCNC: 33.4 G/DL (ref 31.5–35.7)
MCV RBC AUTO: 92 FL (ref 79–97)
MONOCYTES # BLD AUTO: 0.4 X10E3/UL (ref 0.1–0.9)
MONOCYTES NFR BLD AUTO: 6 %
NEUTROPHILS # BLD AUTO: 3.7 X10E3/UL (ref 1.4–7)
NEUTROPHILS NFR BLD AUTO: 53 %
PLATELET # BLD AUTO: 296 X10E3/UL (ref 150–450)
POTASSIUM SERPL-SCNC: 3.9 MMOL/L (ref 3.5–5.2)
PROT SERPL-MCNC: 7.1 G/DL (ref 6–8.5)
RBC # BLD AUTO: 4.24 X10E6/UL (ref 4.14–5.8)
SODIUM SERPL-SCNC: 140 MMOL/L (ref 134–144)
TRIGL SERPL-MCNC: 190 MG/DL (ref 0–149)
VIT B12 SERPL-MCNC: 803 PG/ML (ref 232–1245)
VLDLC SERPL CALC-MCNC: 34 MG/DL (ref 5–40)
WBC # BLD AUTO: 6.9 X10E3/UL (ref 3.4–10.8)

## 2024-04-17 DIAGNOSIS — I10 ESSENTIAL HYPERTENSION: ICD-10-CM

## 2024-04-18 RX ORDER — LISINOPRIL 40 MG/1
40 TABLET ORAL DAILY
Qty: 90 TABLET | Refills: 1 | Status: SHIPPED | OUTPATIENT
Start: 2024-04-18

## 2024-05-20 DIAGNOSIS — G40.909 SEIZURE DISORDER: ICD-10-CM

## 2024-05-20 NOTE — TELEPHONE ENCOUNTER
Rx Refill Note  Requested Prescriptions     Pending Prescriptions Disp Refills    LORazepam (ATIVAN) 2 MG tablet 60 tablet 2     Sig: Take 1 tablet by mouth 2 (Two) Times a Day As Needed for Anxiety.      Last office visit with prescribing clinician: 3/22/2024   Last telemedicine visit with prescribing clinician: Visit date not found   Next office visit with prescribing clinician: Visit date not found                         Would you like a call back once the refill request has been completed: [] Yes [] No    If the office needs to give you a call back, can they leave a voicemail: [] Yes [] No    Cecily Grullon MA  05/20/24, 17:17 EDT

## 2024-05-22 RX ORDER — LORAZEPAM 2 MG/1
2 TABLET ORAL 2 TIMES DAILY PRN
Qty: 60 TABLET | Refills: 2 | Status: SHIPPED | OUTPATIENT
Start: 2024-05-22

## 2024-05-28 DIAGNOSIS — Z87.19 HISTORY OF DIVERTICULITIS: ICD-10-CM

## 2024-05-29 NOTE — TELEPHONE ENCOUNTER
Rx Refill Note  Requested Prescriptions     Pending Prescriptions Disp Refills    diphenoxylate-atropine (LOMOTIL) 2.5-0.025 MG per tablet 30 tablet 0     Sig: Take 1 tablet by mouth 4 (Four) Times a Day As Needed for Diarrhea.      Last office visit with prescribing clinician: 3/22/2024   Last telemedicine visit with prescribing clinician: Visit date not found   Next office visit with prescribing clinician: 6/26/2024                          Okay to fill?      Haleigh Ragsdale MA  05/29/24, 11:49 EDT

## 2024-05-30 RX ORDER — DIPHENOXYLATE HYDROCHLORIDE AND ATROPINE SULFATE 2.5; .025 MG/1; MG/1
1 TABLET ORAL 4 TIMES DAILY PRN
Qty: 30 TABLET | Refills: 0 | Status: SHIPPED | OUTPATIENT
Start: 2024-05-30

## 2024-06-26 ENCOUNTER — OFFICE VISIT (OUTPATIENT)
Dept: FAMILY MEDICINE CLINIC | Facility: CLINIC | Age: 64
End: 2024-06-26
Payer: MEDICARE

## 2024-06-26 VITALS
WEIGHT: 143 LBS | HEART RATE: 134 BPM | OXYGEN SATURATION: 97 % | HEIGHT: 66 IN | SYSTOLIC BLOOD PRESSURE: 150 MMHG | DIASTOLIC BLOOD PRESSURE: 90 MMHG | BODY MASS INDEX: 22.98 KG/M2

## 2024-06-26 DIAGNOSIS — I10 ESSENTIAL HYPERTENSION: ICD-10-CM

## 2024-06-26 DIAGNOSIS — G40.909 SEIZURE DISORDER: ICD-10-CM

## 2024-06-26 DIAGNOSIS — G89.4 CHRONIC PAIN SYNDROME: ICD-10-CM

## 2024-06-26 DIAGNOSIS — D22.9 CHANGING NEVUS: Primary | ICD-10-CM

## 2024-06-26 PROCEDURE — 3077F SYST BP >= 140 MM HG: CPT | Performed by: NURSE PRACTITIONER

## 2024-06-26 PROCEDURE — 3080F DIAST BP >= 90 MM HG: CPT | Performed by: NURSE PRACTITIONER

## 2024-06-26 PROCEDURE — 1125F AMNT PAIN NOTED PAIN PRSNT: CPT | Performed by: NURSE PRACTITIONER

## 2024-06-26 PROCEDURE — 99214 OFFICE O/P EST MOD 30 MIN: CPT | Performed by: NURSE PRACTITIONER

## 2024-06-26 PROCEDURE — 1160F RVW MEDS BY RX/DR IN RCRD: CPT | Performed by: NURSE PRACTITIONER

## 2024-06-26 PROCEDURE — 1159F MED LIST DOCD IN RCRD: CPT | Performed by: NURSE PRACTITIONER

## 2024-06-26 NOTE — PROGRESS NOTES
Subjective     Chief Complaint:    Chief Complaint   Patient presents with    Hypertension       History of Present Illness:         Answers submitted by the patient for this visit:  Primary Reason for Visit (Submitted on 6/21/2024)  What is the primary reason for your visit?: Other  Other (Submitted on 6/21/2024)  Please describe your symptoms.: Possible squamous cell carcinoma on left forearm.  Have you had these symptoms before?: No  How long have you been having these symptoms?: 1-2 weeks  Please list any medications you are currently taking for this condition.: None  Please describe any probable cause for these symptoms. : Unknown have photos of when I first noticed the spot on my left forearm.    Review of Systems  Gen- No fevers, chills  CV- No chest pain, palpitations  Resp- No cough, dyspnea  GI- No N/V/D, abd pain  Neuro-No dizziness, headaches      I have reviewed and/or updated the patient's past medical, surgical, family, social history and problem list as appropriate.     Medications:    Current Outpatient Medications:     acetaminophen-codeine (TYLENOL #3) 300-30 MG per tablet, Take 1 tablet by mouth Every 6 (Six) Hours As Needed for Moderate Pain., Disp: , Rfl:     diphenoxylate-atropine (LOMOTIL) 2.5-0.025 MG per tablet, Take 1 tablet by mouth 4 (Four) Times a Day As Needed for Diarrhea., Disp: 30 tablet, Rfl: 0    esomeprazole (nexIUM) 20 MG capsule, Take 1 capsule by mouth Every Morning Before Breakfast., Disp: , Rfl:     famotidine (Pepcid) 20 MG tablet, Take 1 tablet by mouth 2 (Two) Times a Day As Needed for Heartburn., Disp: 30 tablet, Rfl: 0    ibuprofen (ADVIL,MOTRIN) 200 MG tablet, , Disp: , Rfl:     lisinopril (PRINIVIL,ZESTRIL) 40 MG tablet, Take 1 tablet by mouth Daily., Disp: 90 tablet, Rfl: 1    LORazepam (ATIVAN) 2 MG tablet, Take 1 tablet by mouth 2 (Two) Times a Day As Needed for Anxiety., Disp: 60 tablet, Rfl: 2    Morphine 1 MG/ML solution, Infuse  into a venous catheter  "Continuous., Disp: , Rfl:     multivitamin with minerals tablet tablet, Take 1 tablet by mouth Daily., Disp: , Rfl:     NARCAN 4 MG/0.1ML nasal spray, USE AS NEEDED -GO TO THE ER, Disp: , Rfl: 0    traMADol (ULTRAM) 50 MG tablet, TAKE ONE TABLET BY MOUTH THREE TIMES A DAY, Disp: , Rfl: 0    Allergies:  No Known Allergies    Objective     Vital Signs:   Vitals:    06/26/24 1218   BP: 150/90   Pulse: (!) 134   SpO2: 97%   Weight: 64.9 kg (143 lb)   Height: 167.6 cm (66\")     Body mass index is 23.08 kg/m².    Physical Exam:    Physical Exam    Assessment / Plan     Assessment/Plan:   Problem List Items Addressed This Visit    None        Discussed plan of care in detail with pt today; pt verb understanding and agrees.    Follow up:  No follow-ups on file.    Total Time of Encounter ***    Electronically signed by DAVID Dobson   06/26/2024 12:25 EDT      Please note that portions of this note were completed with a voice recognition program.   "

## 2024-06-26 NOTE — PROGRESS NOTES
Subjective     Chief Complaint:    Chief Complaint   Patient presents with   • Hypertension       History of Present Illness:   Has been seeing Dr. Hector for pain management. Is managing his pain pump, morphine.   He continues tramadol, and tylenol #3.   He has htn, takes lisinopril. home readings normal, has white coat syndrome, Brings in home machines which does read 10 points lower than clinic manual read.  Has hx of diverticulitits. Uses lomotil as needed. Uses Sparingly  Has hx of epileptic seizure and has not had any for several years. Ativan helps. Also helps anxiety.   Hld, diet controlled  2 changing skin lesions on his left and right arm, lesion on left turned red/purple after sun exposure but now has return to its baseline, several age spots noted        Review of Systems  Gen- No fevers, chills  CV- No chest pain, palpitations  Resp- No cough, dyspnea  GI- No N/V/D, abd pain  Neuro-No dizziness, headaches      I have reviewed and/or updated the patient's past medical, surgical, family, social history and problem list as appropriate.     Medications:    Current Outpatient Medications:   •  acetaminophen-codeine (TYLENOL #3) 300-30 MG per tablet, Take 1 tablet by mouth Every 6 (Six) Hours As Needed for Moderate Pain., Disp: , Rfl:   •  diphenoxylate-atropine (LOMOTIL) 2.5-0.025 MG per tablet, Take 1 tablet by mouth 4 (Four) Times a Day As Needed for Diarrhea., Disp: 30 tablet, Rfl: 0  •  esomeprazole (nexIUM) 20 MG capsule, Take 1 capsule by mouth Every Morning Before Breakfast., Disp: , Rfl:   •  famotidine (Pepcid) 20 MG tablet, Take 1 tablet by mouth 2 (Two) Times a Day As Needed for Heartburn., Disp: 30 tablet, Rfl: 0  •  ibuprofen (ADVIL,MOTRIN) 200 MG tablet, , Disp: , Rfl:   •  lisinopril (PRINIVIL,ZESTRIL) 40 MG tablet, Take 1 tablet by mouth Daily., Disp: 90 tablet, Rfl: 1  •  LORazepam (ATIVAN) 2 MG tablet, Take 1 tablet by mouth 2 (Two) Times a Day As Needed for Anxiety., Disp: 60 tablet,  "Rfl: 2  •  Morphine 1 MG/ML solution, Infuse  into a venous catheter Continuous., Disp: , Rfl:   •  multivitamin with minerals tablet tablet, Take 1 tablet by mouth Daily., Disp: , Rfl:   •  NARCAN 4 MG/0.1ML nasal spray, USE AS NEEDED -GO TO THE ER, Disp: , Rfl: 0  •  traMADol (ULTRAM) 50 MG tablet, TAKE ONE TABLET BY MOUTH THREE TIMES A DAY, Disp: , Rfl: 0    Allergies:  No Known Allergies    Objective     Vital Signs:   Vitals:    06/26/24 1218   BP: 150/90   Pulse: (!) 134   SpO2: 97%   Weight: 64.9 kg (143 lb)   Height: 167.6 cm (66\")     Body mass index is 23.08 kg/m².    Physical Exam:    Physical Exam  Vitals and nursing note reviewed.   Constitutional:       Appearance: He is well-developed.   HENT:      Head: Normocephalic and atraumatic.   Eyes:      Pupils: Pupils are equal, round, and reactive to light.   Cardiovascular:      Rate and Rhythm: Normal rate and regular rhythm.      Heart sounds: Normal heart sounds.   Pulmonary:      Effort: Pulmonary effort is normal.      Breath sounds: Normal breath sounds.   Abdominal:      General: Bowel sounds are normal. There is no distension.      Palpations: Abdomen is soft.      Tenderness: There is no abdominal tenderness.   Musculoskeletal:      Cervical back: Neck supple.   Skin:     General: Skin is warm and dry.      Findings: Lesion present.   Neurological:      General: No focal deficit present.      Mental Status: He is alert and oriented to person, place, and time.   Psychiatric:         Mood and Affect: Mood normal.         Behavior: Behavior normal.     Tachycardia improved with rest    Assessment / Plan     Assessment/Plan:   Problem List Items Addressed This Visit       Essential hypertension    Relevant Medications    lisinopril (PRINIVIL,ZESTRIL) 40 MG tablet    Chronic pain syndrome     Other Visit Diagnoses       Changing nevus    -  Primary    Relevant Orders    Ambulatory Referral to Dermatology    Seizure disorder                -- BP stable " at home, continue current regimen  -- derm referral for changing skin lesions  -- continue ativan  -- continue f/u with pain management    Controlled Substance  Prescription  As part of this patient's treatment plan I am prescribing controlled substances. The patient has been made aware of appropriate use of such medications, including potential risk of somnolence, limited ability to drive and /or work safely, and potential for dependence or overdose. It has also been made clear that these medications are for use by this patient only, without concomitant use of alcohol or other substances unless prescribed. Marco A reviewed and appropriate     Discussed plan of care in detail with pt today; pt verb understanding and agrees.    Follow up:  3 months, medicare wellness    Electronically signed by DAVID Dobson         Please note that portions of this note were completed with a voice recognition program.

## 2024-08-16 ENCOUNTER — OFFICE VISIT (OUTPATIENT)
Dept: FAMILY MEDICINE CLINIC | Facility: CLINIC | Age: 64
End: 2024-08-16
Payer: MEDICARE

## 2024-08-16 VITALS
BODY MASS INDEX: 23.4 KG/M2 | DIASTOLIC BLOOD PRESSURE: 90 MMHG | OXYGEN SATURATION: 98 % | HEART RATE: 106 BPM | HEIGHT: 66 IN | WEIGHT: 145.6 LBS | SYSTOLIC BLOOD PRESSURE: 142 MMHG

## 2024-08-16 DIAGNOSIS — M25.511 ACUTE PAIN OF RIGHT SHOULDER: Primary | ICD-10-CM

## 2024-08-16 DIAGNOSIS — Z51.81 ENCOUNTER FOR THERAPEUTIC DRUG MONITORING: ICD-10-CM

## 2024-08-16 DIAGNOSIS — G89.4 CHRONIC PAIN SYNDROME: ICD-10-CM

## 2024-08-16 NOTE — PROGRESS NOTES
Subjective     Chief Complaint:    Chief Complaint   Patient presents with    Shoulder Pain     Right shoulder pain, 2 months       History of Present Illness:   Right should pain x 2 months, progressive, wakes up at night with pain, movement worsens pain, rest helps pain  Has chronic pain and follows with pain management       Review of Systems  Gen- No fevers, chills  CV- No chest pain, palpitations  Resp- No cough, dyspnea  GI- No N/V/D, abd pain  Neuro-No dizziness, headaches      I have reviewed and/or updated the patient's past medical, surgical, family, social history and problem list as appropriate.     Medications:    Current Outpatient Medications:     acetaminophen-codeine (TYLENOL #3) 300-30 MG per tablet, Take 1 tablet by mouth Every 6 (Six) Hours As Needed for Moderate Pain., Disp: , Rfl:     diphenoxylate-atropine (LOMOTIL) 2.5-0.025 MG per tablet, Take 1 tablet by mouth 4 (Four) Times a Day As Needed for Diarrhea., Disp: 30 tablet, Rfl: 0    esomeprazole (nexIUM) 20 MG capsule, Take 1 capsule by mouth Every Morning Before Breakfast., Disp: , Rfl:     famotidine (Pepcid) 20 MG tablet, Take 1 tablet by mouth 2 (Two) Times a Day As Needed for Heartburn., Disp: 30 tablet, Rfl: 0    ibuprofen (ADVIL,MOTRIN) 200 MG tablet, , Disp: , Rfl:     lisinopril (PRINIVIL,ZESTRIL) 40 MG tablet, Take 1 tablet by mouth Daily., Disp: 90 tablet, Rfl: 1    LORazepam (ATIVAN) 2 MG tablet, Take 1 tablet by mouth 2 (Two) Times a Day As Needed for Anxiety., Disp: 60 tablet, Rfl: 2    Morphine 1 MG/ML solution, Infuse  into a venous catheter Continuous., Disp: , Rfl:     multivitamin with minerals tablet tablet, Take 1 tablet by mouth Daily., Disp: , Rfl:     NARCAN 4 MG/0.1ML nasal spray, USE AS NEEDED -GO TO THE ER, Disp: , Rfl: 0    traMADol (ULTRAM) 50 MG tablet, TAKE ONE TABLET BY MOUTH THREE TIMES A DAY, Disp: , Rfl: 0    Allergies:  Allergies   Allergen Reactions    Sertraline GI Intolerance     seizure  "      Objective     Vital Signs:   Vitals:    08/16/24 1336   BP: 142/90   Pulse: 106   SpO2: 98%   Weight: 66 kg (145 lb 9.6 oz)   Height: 167.6 cm (65.98\")   PainSc:   5     Body mass index is 23.51 kg/m².    Physical Exam:    Physical Exam  Vitals and nursing note reviewed.   Constitutional:       Appearance: He is well-developed.   HENT:      Head: Normocephalic and atraumatic.   Eyes:      Pupils: Pupils are equal, round, and reactive to light.   Cardiovascular:      Rate and Rhythm: Normal rate and regular rhythm.      Heart sounds: Normal heart sounds.   Pulmonary:      Effort: Pulmonary effort is normal.      Breath sounds: Normal breath sounds.   Abdominal:      General: Bowel sounds are normal. There is no distension.      Palpations: Abdomen is soft.      Tenderness: There is no abdominal tenderness.   Musculoskeletal:      Right shoulder: Bony tenderness present. Decreased range of motion.      Cervical back: Neck supple.      Comments: Pain with 180degree elevation, + arc test, + empty can    Skin:     General: Skin is warm and dry.   Neurological:      General: No focal deficit present.      Mental Status: He is alert and oriented to person, place, and time.   Psychiatric:         Mood and Affect: Mood normal.         Behavior: Behavior normal.         Assessment / Plan     Assessment/Plan:   Problem List Items Addressed This Visit       Chronic pain syndrome    Relevant Orders    Compliance Drug Analysis, Ur - Urine, Clean Catch     Other Visit Diagnoses       Acute pain of right shoulder    -  Primary    Relevant Orders    XR Shoulder 2+ View Right (In Office)    Encounter for therapeutic drug monitoring        Relevant Orders    Compliance Drug Analysis, Ur - Urine, Clean Catch          -- check xray, declines outpatient pt, agreeable to home pt program, consider mri if no improvement     Discussed plan of care in detail with pt today; pt verb understanding and agrees.    Follow up:  As scheduled "     Electronically signed by DAVID Dobson   08/16/2024 13:47 EDT      Please note that portions of this note were completed with a voice recognition program.   Answers submitted by the patient for this visit:  Primary Reason for Visit (Submitted on 8/16/2024)  What is the primary reason for your visit?: Extremity Pain  Lower Extremity Injury Questionnaire (Submitted on 8/16/2024)  Chief Complaint: Extremity pain  Injury: No  Pain location: right shoulder, right arm  Pain quality: aching, stabbing  Pain - numeric: 5/10  Progression since onset: comes and goes  tingling: No  difficulty holding things: No  upper extremity swelling: No  redness: No

## 2024-08-16 NOTE — PROGRESS NOTES
Shoulder xray normal, continue plan of home PT and consider MRI if no improvement as well as talking to Dr Hector about injection

## 2024-08-20 DIAGNOSIS — G40.909 SEIZURE DISORDER: ICD-10-CM

## 2024-08-20 NOTE — TELEPHONE ENCOUNTER
Rx Refill Note  Requested Prescriptions     Pending Prescriptions Disp Refills    LORazepam (ATIVAN) 2 MG tablet 60 tablet 2     Sig: Take 1 tablet by mouth 2 (Two) Times a Day As Needed for Anxiety.      Last office visit with prescribing clinician: 8/16/2024   Last telemedicine visit with prescribing clinician: Visit date not found   Next office visit with prescribing clinician: 9/27/2024                         Would you like a call back once the refill request has been completed: [] Yes [] No    If the office needs to give you a call back, can they leave a voicemail: [] Yes [] No    Rosie Padron MA  08/20/24, 15:42 EDT

## 2024-08-23 RX ORDER — LORAZEPAM 2 MG/1
2 TABLET ORAL 2 TIMES DAILY PRN
Qty: 60 TABLET | Refills: 2 | Status: SHIPPED | OUTPATIENT
Start: 2024-08-23

## 2024-10-16 DIAGNOSIS — I10 ESSENTIAL HYPERTENSION: ICD-10-CM

## 2024-10-17 RX ORDER — LISINOPRIL 40 MG/1
40 TABLET ORAL DAILY
Qty: 90 TABLET | Refills: 1 | Status: SHIPPED | OUTPATIENT
Start: 2024-10-17

## 2024-10-21 ENCOUNTER — OFFICE VISIT (OUTPATIENT)
Dept: FAMILY MEDICINE CLINIC | Facility: CLINIC | Age: 64
End: 2024-10-21
Payer: MEDICARE

## 2024-10-21 VITALS
HEART RATE: 112 BPM | WEIGHT: 147.13 LBS | BODY MASS INDEX: 23.65 KG/M2 | SYSTOLIC BLOOD PRESSURE: 130 MMHG | HEIGHT: 66 IN | DIASTOLIC BLOOD PRESSURE: 90 MMHG | OXYGEN SATURATION: 98 %

## 2024-10-21 DIAGNOSIS — E78.2 MIXED HYPERLIPIDEMIA: ICD-10-CM

## 2024-10-21 DIAGNOSIS — K21.9 GASTROESOPHAGEAL REFLUX DISEASE WITHOUT ESOPHAGITIS: ICD-10-CM

## 2024-10-21 DIAGNOSIS — Z87.828 HISTORY OF SPINAL CORD INJURY: ICD-10-CM

## 2024-10-21 DIAGNOSIS — G40.109 AUTONOMIC EPILEPTIC SEIZURES: ICD-10-CM

## 2024-10-21 DIAGNOSIS — G89.4 CHRONIC PAIN SYNDROME: ICD-10-CM

## 2024-10-21 DIAGNOSIS — G89.29 CHRONIC RIGHT SHOULDER PAIN: ICD-10-CM

## 2024-10-21 DIAGNOSIS — I10 ESSENTIAL HYPERTENSION: ICD-10-CM

## 2024-10-21 DIAGNOSIS — Z00.00 MEDICARE ANNUAL WELLNESS VISIT, SUBSEQUENT: Primary | ICD-10-CM

## 2024-10-21 DIAGNOSIS — M25.511 CHRONIC RIGHT SHOULDER PAIN: ICD-10-CM

## 2024-10-21 DIAGNOSIS — Z23 NEED FOR INFLUENZA VACCINATION: ICD-10-CM

## 2024-10-21 DIAGNOSIS — F41.1 GAD (GENERALIZED ANXIETY DISORDER): ICD-10-CM

## 2024-10-21 PROCEDURE — 1125F AMNT PAIN NOTED PAIN PRSNT: CPT | Performed by: NURSE PRACTITIONER

## 2024-10-21 PROCEDURE — 3075F SYST BP GE 130 - 139MM HG: CPT | Performed by: NURSE PRACTITIONER

## 2024-10-21 PROCEDURE — 1159F MED LIST DOCD IN RCRD: CPT | Performed by: NURSE PRACTITIONER

## 2024-10-21 PROCEDURE — G0008 ADMIN INFLUENZA VIRUS VAC: HCPCS | Performed by: NURSE PRACTITIONER

## 2024-10-21 PROCEDURE — G0439 PPPS, SUBSEQ VISIT: HCPCS | Performed by: NURSE PRACTITIONER

## 2024-10-21 PROCEDURE — 3080F DIAST BP >= 90 MM HG: CPT | Performed by: NURSE PRACTITIONER

## 2024-10-21 PROCEDURE — 99214 OFFICE O/P EST MOD 30 MIN: CPT | Performed by: NURSE PRACTITIONER

## 2024-10-21 PROCEDURE — 99396 PREV VISIT EST AGE 40-64: CPT | Performed by: NURSE PRACTITIONER

## 2024-10-21 PROCEDURE — 1170F FXNL STATUS ASSESSED: CPT | Performed by: NURSE PRACTITIONER

## 2024-10-21 PROCEDURE — 90656 IIV3 VACC NO PRSV 0.5 ML IM: CPT | Performed by: NURSE PRACTITIONER

## 2024-10-21 PROCEDURE — 1160F RVW MEDS BY RX/DR IN RCRD: CPT | Performed by: NURSE PRACTITIONER

## 2024-10-21 RX ORDER — LIDOCAINE/PRILOCAINE 2.5 %-2.5%
CREAM (GRAM) TOPICAL
COMMUNITY
Start: 2024-10-15

## 2024-10-21 NOTE — PROGRESS NOTES
Subjective   The ABCs of the Annual Wellness Visit  Medicare Wellness Visit      Jonas Terrazas is a 63 y.o. patient who presents for a Medicare Wellness Visit.    The following portions of the patient's history were reviewed and   updated as appropriate: allergies, current medications, past family history, past medical history, past social history, past surgical history, and problem list.    Compared to one year ago, the patient's physical   health is the same.  Compared to one year ago, the patient's mental   health is the same.    Recent Hospitalizations:  He was not admitted to the hospital during the last year.     Current Medical Providers:  Patient Care Team:  Taylor Rose APRN as PCP - General (Nurse Practitioner)    Outpatient Medications Prior to Visit   Medication Sig Dispense Refill    acetaminophen-codeine (TYLENOL #3) 300-30 MG per tablet Take 1 tablet by mouth Every 6 (Six) Hours As Needed for Moderate Pain.      diphenoxylate-atropine (LOMOTIL) 2.5-0.025 MG per tablet Take 1 tablet by mouth 4 (Four) Times a Day As Needed for Diarrhea. 30 tablet 0    esomeprazole (nexIUM) 20 MG capsule Take 1 capsule by mouth Every Morning Before Breakfast.      famotidine (Pepcid) 20 MG tablet Take 1 tablet by mouth 2 (Two) Times a Day As Needed for Heartburn. 30 tablet 0    ibuprofen (ADVIL,MOTRIN) 200 MG tablet       lidocaine-prilocaine (EMLA) 2.5-2.5 % cream APPLY 30 MINUTES PRIOR TO PUMP REFILL      lisinopril (PRINIVIL,ZESTRIL) 40 MG tablet Take 1 tablet by mouth Daily. 90 tablet 1    LORazepam (ATIVAN) 2 MG tablet Take 1 tablet by mouth 2 (Two) Times a Day As Needed for Anxiety. 60 tablet 2    Morphine 1 MG/ML solution Infuse  into a venous catheter Continuous.      multivitamin with minerals tablet tablet Take 1 tablet by mouth Daily.      NARCAN 4 MG/0.1ML nasal spray USE AS NEEDED -GO TO THE ER  0    traMADol (ULTRAM) 50 MG tablet TAKE ONE TABLET BY MOUTH THREE TIMES A DAY  0     No  "facility-administered medications prior to visit.     Opioid medication/s are on active medication list.  and I have evaluated his active treatment plan and pain score trends (see table).  Vitals:    10/21/24 1531   PainSc:   3   PainLoc: Shoulder  Comment: Right shoulder.     I have reviewed the chart for potential of high risk medication and harmful drug interactions in the elderly.        Aspirin is not on active medication list.  Aspirin use is not indicated based on review of current medical condition/s. Risk of harm outweighs potential benefits.  .    Patient Active Problem List   Diagnosis    Essential hypertension    Gastroesophageal reflux disease without esophagitis    Hemiparesis    Mixed hyperlipidemia    Autonomic epileptic seizures    Chronic pain syndrome    Vasovagal syncope    Colitis    History of spinal cord injury    Macrocytic anemia with vitamin B12 deficiency    BRIAN (generalized anxiety disorder)     Advance Care Planning Advance Directive is not on file.  ACP discussion was held with the patient during this visit. Patient does not have an advance directive, information provided.            Objective   Vitals:    10/21/24 1531   BP: 130/90   Pulse: 112   SpO2: 98%   Weight: 66.7 kg (147 lb 2 oz)   Height: 167.6 cm (65.98\")   PainSc:   3   PainLoc: Shoulder  Comment: Right shoulder.       Estimated body mass index is 23.76 kg/m² as calculated from the following:    Height as of this encounter: 167.6 cm (65.98\").    Weight as of this encounter: 66.7 kg (147 lb 2 oz).    BMI is within normal parameters. No other follow-up for BMI required.       Does the patient have evidence of cognitive impairment? No                                                                                                Health  Risk Assessment    Smoking Status:  Social History     Tobacco Use   Smoking Status Never    Passive exposure: Never   Smokeless Tobacco Never     Alcohol Consumption:  Social History "     Substance and Sexual Activity   Alcohol Use Yes    Alcohol/week: 1.0 standard drink of alcohol    Types: 1 Glasses of wine per week    Comment: Once or twice a week - Red wine with dinner.       Fall Risk Screen  STEADI Fall Risk Assessment was completed, and patient is at LOW risk for falls.Assessment completed on:10/21/2024    Depression Screening:      10/21/2024     3:43 PM   PHQ-2/PHQ-9 Depression Screening   Little interest or pleasure in doing things Not at all   Feeling down, depressed, or hopeless Not at all     Health Habits and Functional and Cognitive Screening:      10/21/2024     3:37 PM   Functional & Cognitive Status   Do you have difficulty preparing food and eating? No   Do you have difficulty bathing yourself, getting dressed or grooming yourself? No   Do you have difficulty using the toilet? No   Do you have difficulty moving around from place to place? No   Do you have trouble with steps or getting out of a bed or a chair? Yes   Current Diet Well Balanced Diet        Current Diet Comment with limited junk food   Dental Exam --        Dental Exam Comment has dentures   Eye Exam Up to date   Exercise (times per week) 3 times per week   Current Exercises Include Home Exercise Program (TV, Computer, Etc.)   Do you need help using the phone?  No   Are you deaf or do you have serious difficulty hearing?  Yes   Do you need help to go to places out of walking distance? Yes   Do you need help shopping? No   Do you need help preparing meals?  No   Do you need help with housework?  No   Do you need help with laundry? No   Do you need help taking your medications? No   Do you need help managing money? No   Do you ever drive or ride in a car without wearing a seat belt? No   Have you felt unusual stress, anger or loneliness in the last month? No   Who do you live with? Spouse   If you need help, do you have trouble finding someone available to you? No   Have you been bothered in the last four weeks by  sexual problems? No   Do you have difficulty concentrating, remembering or making decisions? No           Age-appropriate Screening Schedule:  Refer to the list below for future screening recommendations based on patient's age, sex and/or medical conditions. Orders for these recommended tests are listed in the plan section. The patient has been provided with a written plan.    Health Maintenance List  Health Maintenance   Topic Date Due    TDAP/TD VACCINES (2 - Td or Tdap) 06/12/2022    INFLUENZA VACCINE  08/01/2024    LIPID PANEL  03/27/2025    COLORECTAL CANCER SCREENING  05/12/2025    ANNUAL WELLNESS VISIT  10/21/2025    HEPATITIS C SCREENING  Completed    COVID-19 Vaccine  Completed    Pneumococcal Vaccine 0-64  Aged Out    ZOSTER VACCINE  Discontinued                                                                                                                                                CMS Preventative Services Quick Reference  Risk Factors Identified During Encounter  Chronic Pain:  continue f/u    The above risks/problems have been discussed with the patient.  Pertinent information has been shared with the patient in the After Visit Summary.  An After Visit Summary and PPPS were made available to the patient.    Follow Up:   Next Medicare Wellness visit to be scheduled in 1 year.         Additional E&M Note during same encounter follows:  Patient has additional, significant, and separately identifiable condition(s)/problem(s) that require work above and beyond the Medicare Wellness Visit     Chief Complaint  Medicare Wellness-subsequent (Subsequent Medicare wellness/)    Subjective   HPI  Jonas is also being seen today for an annual adult preventative physical exam.  and Jonas is also being seen today for additional medical problem/s.  Has been seeing Dr. Hector for pain management. Is managing his pain pump, morphine.   He continues tramadol, and tylenol #3.   He has htn, takes lisinopril. home readings  "normal, has white coat syndrome,  Has hx of diverticulitits. Uses lomotil as needed. Uses Sparingly  Has hx of epileptic seizure and has not had any for several years. Ativan helps. Also helps anxiety.   Hld, diet controlled  Notes continued right shoulder pain x several months, has been doing home PT without improvement for over 6 weeks, pain wakes him up at night                  Objective   Vital Signs:  /90   Pulse 112   Ht 167.6 cm (65.98\")   Wt 66.7 kg (147 lb 2 oz)   SpO2 98%   BMI 23.76 kg/m²   Physical Exam  Constitutional:       Appearance: Normal appearance. He is well-developed.   HENT:      Head: Normocephalic and atraumatic.      Right Ear: Tympanic membrane, ear canal and external ear normal.      Left Ear: Tympanic membrane, ear canal and external ear normal.      Nose: Nose normal.      Mouth/Throat:      Pharynx: Uvula midline.   Eyes:      Pupils: Pupils are equal, round, and reactive to light.   Cardiovascular:      Rate and Rhythm: Normal rate and regular rhythm.      Heart sounds: Normal heart sounds. No murmur heard.     No friction rub. No gallop.   Pulmonary:      Effort: Pulmonary effort is normal.      Breath sounds: Normal breath sounds.   Abdominal:      General: Bowel sounds are normal.      Palpations: Abdomen is soft.      Tenderness: There is no abdominal tenderness.   Musculoskeletal:      Right shoulder: Tenderness present. Decreased range of motion.      Cervical back: Neck supple.      Comments: + arc test on right   Lymphadenopathy:      Head:      Right side of head: No submental, submandibular, tonsillar, preauricular or posterior auricular adenopathy.      Left side of head: No submental, submandibular, tonsillar, preauricular or posterior auricular adenopathy.      Cervical: No cervical adenopathy.   Skin:     General: Skin is warm and dry.   Neurological:      General: No focal deficit present.      Mental Status: He is alert and oriented to person, place, and " time.      Motor: Weakness present.      Gait: Gait abnormal.   Psychiatric:         Mood and Affect: Mood normal.         Behavior: Behavior normal.                 Assessment and Plan               Essential hypertension  -- controlled on current regimen   Mixed hyperlipidemia   -- diet controlled, continue heart healthy diet, flp march 2024  Gastroesophageal reflux disease without esophagitis  -- prn pepcid  BRIAN (generalized anxiety disorder)  -- ativan, csa and uds UTD  Autonomic epileptic seizures  -- ativan   Chronic right shoulder pain  -- continue home PT, he needs MRI but wants to wait until after the holidays  Need for influenza vaccination    Medicare annual wellness visit, subsequent  -- wellness visit performed, avoid prolonged fasting periods, ensure good hydration, stay active, yearly eye exam    Chronic pain syndrome  -- continue f/u with pain management   History of spinal cord injury    No orders of the defined types were placed in this encounter.            Follow Up   Return in about 6 months (around 4/21/2025).  Patient was given instructions and counseling regarding his condition or for health maintenance advice. Please see specific information pulled into the AVS if appropriate.

## 2024-11-19 DIAGNOSIS — G40.909 SEIZURE DISORDER: ICD-10-CM

## 2024-11-19 NOTE — TELEPHONE ENCOUNTER
Rx Refill Note  Requested Prescriptions     Pending Prescriptions Disp Refills    LORazepam (ATIVAN) 2 MG tablet [Pharmacy Med Name: lorazepam 2 mg tablet] 60 tablet 2     Sig: TAKE 1 TABLET BY MOUTH 2 (TWO) TIMES A DAY AS NEEDED FOR ANXIETY.      Last office visit with prescribing clinician: 10/21/2024   Last telemedicine visit with prescribing clinician: Visit date not found   Next office visit with prescribing clinician: Visit date not found                         Would you like a call back once the refill request has been completed: [] Yes [] No    If the office needs to give you a call back, can they leave a voicemail: [] Yes [] No    Haleigh Ragsdale MA  11/19/24, 15:26 EST

## 2024-11-20 RX ORDER — LORAZEPAM 2 MG/1
2 TABLET ORAL 2 TIMES DAILY PRN
Qty: 60 TABLET | Refills: 2 | Status: SHIPPED | OUTPATIENT
Start: 2024-11-20

## 2025-02-14 DIAGNOSIS — G40.909 SEIZURE DISORDER: ICD-10-CM

## 2025-02-14 RX ORDER — LORAZEPAM 2 MG/1
2 TABLET ORAL 2 TIMES DAILY PRN
Qty: 60 TABLET | Refills: 2 | Status: SHIPPED | OUTPATIENT
Start: 2025-02-14

## 2025-02-14 NOTE — TELEPHONE ENCOUNTER
Rx Refill Note  Requested Prescriptions     Pending Prescriptions Disp Refills    LORazepam (ATIVAN) 2 MG tablet [Pharmacy Med Name: lorazepam 2 mg tablet] 60 tablet 2     Sig: TAKE 1 TABLET BY MOUTH 2 (TWO) TIMES A DAY AS NEEDED FOR ANXIETY.      Last office visit with prescribing clinician: 10/21/2024   Last telemedicine visit with prescribing clinician: Visit date not found   Next office visit with prescribing clinician: Visit date not found                         Would you like a call back once the refill request has been completed: [] Yes [] No    If the office needs to give you a call back, can they leave a voicemail: [] Yes [] No    Sravani Madrigal, [facility]  02/14/25, 16:58 EST

## 2025-04-04 DIAGNOSIS — Z87.19 HISTORY OF DIVERTICULITIS: ICD-10-CM

## 2025-04-04 RX ORDER — DIPHENOXYLATE HYDROCHLORIDE AND ATROPINE SULFATE 2.5; .025 MG/1; MG/1
1 TABLET ORAL 4 TIMES DAILY PRN
Qty: 30 TABLET | Refills: 0 | Status: SHIPPED | OUTPATIENT
Start: 2025-04-04

## 2025-04-04 NOTE — TELEPHONE ENCOUNTER
Rx Refill Note  Requested Prescriptions     Pending Prescriptions Disp Refills    diphenoxylate-atropine (LOMOTIL) 2.5-0.025 MG per tablet 30 tablet 0     Sig: Take 1 tablet by mouth 4 (Four) Times a Day As Needed for Diarrhea.      Last office visit with prescribing clinician: 10/21/2024   Last telemedicine visit with prescribing clinician: Visit date not found   Next office visit with prescribing clinician: 4/28/2025                         Would you like a call back once the refill request has been completed: [] Yes [] No    If the office needs to give you a call back, can they leave a voicemail: [] Yes [] No    Brenda Urbina MA  04/04/25, 11:56 EDT

## 2025-04-13 DIAGNOSIS — I10 ESSENTIAL HYPERTENSION: ICD-10-CM

## 2025-04-14 RX ORDER — LISINOPRIL 40 MG/1
40 TABLET ORAL DAILY
Qty: 90 TABLET | Refills: 1 | Status: SHIPPED | OUTPATIENT
Start: 2025-04-14

## 2025-04-28 ENCOUNTER — OFFICE VISIT (OUTPATIENT)
Dept: FAMILY MEDICINE CLINIC | Facility: CLINIC | Age: 65
End: 2025-04-28
Payer: MEDICARE

## 2025-04-28 VITALS
HEART RATE: 97 BPM | WEIGHT: 152.2 LBS | OXYGEN SATURATION: 98 % | SYSTOLIC BLOOD PRESSURE: 144 MMHG | BODY MASS INDEX: 24.46 KG/M2 | HEIGHT: 66 IN | DIASTOLIC BLOOD PRESSURE: 76 MMHG | RESPIRATION RATE: 16 BRPM

## 2025-04-28 DIAGNOSIS — E78.2 MIXED HYPERLIPIDEMIA: ICD-10-CM

## 2025-04-28 DIAGNOSIS — G89.4 CHRONIC PAIN SYNDROME: ICD-10-CM

## 2025-04-28 DIAGNOSIS — K21.9 GASTROESOPHAGEAL REFLUX DISEASE WITHOUT ESOPHAGITIS: ICD-10-CM

## 2025-04-28 DIAGNOSIS — I10 ESSENTIAL HYPERTENSION: Primary | ICD-10-CM

## 2025-04-28 DIAGNOSIS — Z87.19 HISTORY OF DIVERTICULITIS: ICD-10-CM

## 2025-04-28 DIAGNOSIS — G40.109 AUTONOMIC EPILEPTIC SEIZURES: ICD-10-CM

## 2025-04-28 DIAGNOSIS — G40.909 SEIZURE DISORDER: ICD-10-CM

## 2025-04-28 RX ORDER — MAGNESIUM SALICYLATE 580 MG/1
TABLET ORAL
COMMUNITY

## 2025-04-28 NOTE — PROGRESS NOTES
Subjective     Chief Complaint:    Chief Complaint   Patient presents with    Hypertension       History of Present Illness:   Has been seeing Dr. Hector for pain management. Is managing his pain pump, morphine.   He continues tramadol, and tylenol #3.   He has htn, takes lisinopril. home readings normal, has white coat syndrome,  Has hx of diverticulitits. Uses lomotil as needed. Uses Sparingly. Recent flare with a few days of treatment   Has hx of epileptic seizure and has not had any for several years. Ativan helps. Also helps anxiety.   Hld, diet controlled  Right shoulder pain is getting better         Review of Systems  Gen- No fevers, chills  CV- No chest pain, palpitations  Resp- No cough, dyspnea  GI- No N/V/D, abd pain  Neuro-No dizziness, headaches      I have reviewed and/or updated the patient's past medical, surgical, family, social history and problem list as appropriate.     Medications:    Current Outpatient Medications:     acetaminophen-codeine (TYLENOL #3) 300-30 MG per tablet, Take 1 tablet by mouth Every 6 (Six) Hours As Needed for Moderate Pain., Disp: , Rfl:     diphenoxylate-atropine (LOMOTIL) 2.5-0.025 MG per tablet, Take 1 tablet by mouth 4 (Four) Times a Day As Needed for Diarrhea., Disp: 30 tablet, Rfl: 0    esomeprazole (nexIUM) 20 MG capsule, Take 1 capsule by mouth Every Morning Before Breakfast., Disp: , Rfl:     famotidine (Pepcid) 20 MG tablet, Take 1 tablet by mouth 2 (Two) Times a Day As Needed for Heartburn., Disp: 30 tablet, Rfl: 0    ibuprofen (ADVIL,MOTRIN) 200 MG tablet, , Disp: , Rfl:     lidocaine-prilocaine (EMLA) 2.5-2.5 % cream, APPLY 30 MINUTES PRIOR TO PUMP REFILL, Disp: , Rfl:     lisinopril (PRINIVIL,ZESTRIL) 40 MG tablet, Take 1 tablet by mouth Daily., Disp: 90 tablet, Rfl: 1    LORazepam (ATIVAN) 2 MG tablet, TAKE 1 TABLET BY MOUTH 2 (TWO) TIMES A DAY AS NEEDED FOR ANXIETY., Disp: 60 tablet, Rfl: 2    Morphine 1 MG/ML solution, Infuse  into a venous catheter  "Continuous., Disp: , Rfl:     multivitamin with minerals tablet tablet, Take 1 tablet by mouth Daily., Disp: , Rfl:     NARCAN 4 MG/0.1ML nasal spray, USE AS NEEDED -GO TO THE ER, Disp: , Rfl: 0    traMADol (ULTRAM) 50 MG tablet, TAKE ONE TABLET BY MOUTH THREE TIMES A DAY, Disp: , Rfl: 0    Magnesium Salicylate Tetrahyd (Doans Extra Strength) 580 MG tablet, Take  by mouth., Disp: , Rfl:     Allergies:  Allergies   Allergen Reactions    Sertraline GI Intolerance     seizure       Objective     Vital Signs:   Vitals:    04/28/25 1701   BP: 144/76   Pulse: 97   Resp: 16   SpO2: 98%   Weight: 69 kg (152 lb 3.2 oz)   Height: 167.6 cm (65.98\")     Body mass index is 24.58 kg/m².    Physical Exam:    Physical Exam  Vitals and nursing note reviewed.   Constitutional:       Appearance: He is well-developed.   HENT:      Head: Normocephalic and atraumatic.   Eyes:      Pupils: Pupils are equal, round, and reactive to light.   Cardiovascular:      Rate and Rhythm: Normal rate and regular rhythm.      Heart sounds: Normal heart sounds.   Pulmonary:      Effort: Pulmonary effort is normal.      Breath sounds: Normal breath sounds.   Abdominal:      General: Bowel sounds are normal. There is no distension.      Palpations: Abdomen is soft.      Tenderness: There is no abdominal tenderness.   Musculoskeletal:      Cervical back: Neck supple.   Skin:     General: Skin is warm and dry.   Neurological:      General: No focal deficit present.      Mental Status: He is alert and oriented to person, place, and time.   Psychiatric:         Mood and Affect: Mood normal.         Behavior: Behavior normal.       Assessment / Plan     Assessment/Plan:   Problem List Items Addressed This Visit       Essential hypertension - Primary    Relevant Medications    lisinopril (PRINIVIL,ZESTRIL) 40 MG tablet    Other Relevant Orders    Comprehensive Metabolic Panel    CBC Auto Differential    Lipid Panel    Gastroesophageal reflux disease without " esophagitis    Relevant Medications    famotidine (Pepcid) 20 MG tablet    esomeprazole (nexIUM) 20 MG capsule    Mixed hyperlipidemia    Autonomic epileptic seizures    Chronic pain syndrome     Other Visit Diagnoses         Seizure disorder          History of diverticulitis              --The current medical regimen is effective;  continue present plan and medications.  Controlled Substance  Prescription  As part of this patient's treatment plan I am prescribing controlled substances. The patient has been made aware of appropriate use of such medications, including potential risk of somnolence, limited ability to drive and /or work safely, and potential for dependence or overdose. It has also been made clear that these medications are for use by this patient only, without concomitant use of alcohol or other substances unless prescribed. Marco A reviewed and appropriate         Discussed plan of care in detail with pt today; pt verb understanding and agrees.    Follow up:  Return in about 6 months (around 10/28/2025) for Medicare Wellness.    Electronically signed by DAVID Dobson   04/28/2025 17:26 EDT      Please note that portions of this note were completed with a voice recognition program.

## 2025-05-03 LAB
ALBUMIN SERPL-MCNC: 4.1 G/DL (ref 3.5–5.2)
ALBUMIN/GLOB SERPL: 1.6 G/DL
ALP SERPL-CCNC: 87 U/L (ref 39–117)
ALT SERPL-CCNC: 14 U/L (ref 1–41)
AST SERPL-CCNC: 20 U/L (ref 1–40)
BASOPHILS # BLD AUTO: 0.05 10*3/MM3 (ref 0–0.2)
BASOPHILS NFR BLD AUTO: 1 % (ref 0–1.5)
BILIRUB SERPL-MCNC: 0.2 MG/DL (ref 0–1.2)
BUN SERPL-MCNC: 9 MG/DL (ref 8–23)
BUN/CREAT SERPL: 9.3 (ref 7–25)
CALCIUM SERPL-MCNC: 9.2 MG/DL (ref 8.6–10.5)
CHLORIDE SERPL-SCNC: 103 MMOL/L (ref 98–107)
CHOLEST SERPL-MCNC: 209 MG/DL (ref 0–200)
CO2 SERPL-SCNC: 27.8 MMOL/L (ref 22–29)
CREAT SERPL-MCNC: 0.97 MG/DL (ref 0.76–1.27)
EGFRCR SERPLBLD CKD-EPI 2021: 87.2 ML/MIN/1.73
EOSINOPHIL # BLD AUTO: 0.26 10*3/MM3 (ref 0–0.4)
EOSINOPHIL NFR BLD AUTO: 5 % (ref 0.3–6.2)
ERYTHROCYTE [DISTWIDTH] IN BLOOD BY AUTOMATED COUNT: 13.9 % (ref 12.3–15.4)
GLOBULIN SER CALC-MCNC: 2.5 GM/DL
GLUCOSE SERPL-MCNC: 94 MG/DL (ref 65–99)
HCT VFR BLD AUTO: 38.6 % (ref 37.5–51)
HDLC SERPL-MCNC: 28 MG/DL (ref 40–60)
HGB BLD-MCNC: 12.6 G/DL (ref 13–17.7)
IMM GRANULOCYTES # BLD AUTO: 0.01 10*3/MM3 (ref 0–0.05)
IMM GRANULOCYTES NFR BLD AUTO: 0.2 % (ref 0–0.5)
LDLC SERPL CALC-MCNC: 134 MG/DL (ref 0–100)
LYMPHOCYTES # BLD AUTO: 2.12 10*3/MM3 (ref 0.7–3.1)
LYMPHOCYTES NFR BLD AUTO: 41 % (ref 19.6–45.3)
MCH RBC QN AUTO: 30.2 PG (ref 26.6–33)
MCHC RBC AUTO-ENTMCNC: 32.6 G/DL (ref 31.5–35.7)
MCV RBC AUTO: 92.6 FL (ref 79–97)
MONOCYTES # BLD AUTO: 0.33 10*3/MM3 (ref 0.1–0.9)
MONOCYTES NFR BLD AUTO: 6.4 % (ref 5–12)
NEUTROPHILS # BLD AUTO: 2.4 10*3/MM3 (ref 1.7–7)
NEUTROPHILS NFR BLD AUTO: 46.4 % (ref 42.7–76)
NRBC BLD AUTO-RTO: 0 /100 WBC (ref 0–0.2)
PLATELET # BLD AUTO: 322 10*3/MM3 (ref 140–450)
POTASSIUM SERPL-SCNC: 4.1 MMOL/L (ref 3.5–5.2)
PROT SERPL-MCNC: 6.6 G/DL (ref 6–8.5)
RBC # BLD AUTO: 4.17 10*6/MM3 (ref 4.14–5.8)
SODIUM SERPL-SCNC: 140 MMOL/L (ref 136–145)
TRIGL SERPL-MCNC: 258 MG/DL (ref 0–150)
VLDLC SERPL CALC-MCNC: 47 MG/DL (ref 5–40)
WBC # BLD AUTO: 5.17 10*3/MM3 (ref 3.4–10.8)

## 2025-05-12 DIAGNOSIS — G40.909 SEIZURE DISORDER: ICD-10-CM

## 2025-05-12 DIAGNOSIS — D64.9 ANEMIA, UNSPECIFIED TYPE: Primary | ICD-10-CM

## 2025-05-12 RX ORDER — LORAZEPAM 2 MG/1
2 TABLET ORAL 2 TIMES DAILY PRN
Qty: 60 TABLET | Refills: 2 | Status: SHIPPED | OUTPATIENT
Start: 2025-05-12

## 2025-05-12 NOTE — TELEPHONE ENCOUNTER
Rx Refill Note  Requested Prescriptions     Pending Prescriptions Disp Refills    LORazepam (ATIVAN) 2 MG tablet 60 tablet 2     Sig: Take 1 tablet by mouth 2 (Two) Times a Day As Needed for Anxiety.      Last office visit with prescribing clinician: 4/28/2025   Last telemedicine visit with prescribing clinician: Visit date not found   Next office visit with prescribing clinician: 10/27/2025                         Would you like a call back once the refill request has been completed: [] Yes [] No    If the office needs to give you a call back, can they leave a voicemail: [] Yes [] No    Brenda Urbina MA  05/12/25, 11:25 EDT

## 2025-05-14 LAB
FERRITIN SERPL-MCNC: 35.7 NG/ML (ref 30–400)
FOLATE SERPL-MCNC: 7.77 NG/ML (ref 4.78–24.2)
IRON SATN MFR SERPL: 22 % (ref 20–50)
IRON SERPL-MCNC: 88 MCG/DL (ref 59–158)
TIBC SERPL-MCNC: 398 MCG/DL
UIBC SERPL-MCNC: 310 MCG/DL (ref 112–346)
VIT B12 SERPL-MCNC: 560 PG/ML (ref 211–946)

## 2025-08-05 DIAGNOSIS — G40.909 SEIZURE DISORDER: ICD-10-CM

## 2025-08-08 RX ORDER — LORAZEPAM 2 MG/1
2 TABLET ORAL 2 TIMES DAILY PRN
Qty: 60 TABLET | Refills: 2 | Status: SHIPPED | OUTPATIENT
Start: 2025-08-08

## 2025-08-09 DIAGNOSIS — G40.909 SEIZURE DISORDER: ICD-10-CM

## 2025-08-11 RX ORDER — LORAZEPAM 2 MG/1
2 TABLET ORAL 2 TIMES DAILY PRN
Qty: 60 TABLET | Refills: 2 | OUTPATIENT
Start: 2025-08-11